# Patient Record
Sex: MALE | Race: BLACK OR AFRICAN AMERICAN | Employment: UNEMPLOYED | URBAN - METROPOLITAN AREA
[De-identification: names, ages, dates, MRNs, and addresses within clinical notes are randomized per-mention and may not be internally consistent; named-entity substitution may affect disease eponyms.]

---

## 2019-01-01 ENCOUNTER — APPOINTMENT (OUTPATIENT)
Dept: GENERAL RADIOLOGY | Age: 84
DRG: 871 | End: 2019-01-01
Attending: INTERNAL MEDICINE
Payer: MEDICARE

## 2019-01-01 ENCOUNTER — APPOINTMENT (OUTPATIENT)
Dept: GENERAL RADIOLOGY | Age: 84
DRG: 871 | End: 2019-01-01
Attending: FAMILY MEDICINE
Payer: MEDICARE

## 2019-01-01 ENCOUNTER — APPOINTMENT (OUTPATIENT)
Dept: ULTRASOUND IMAGING | Age: 84
DRG: 871 | End: 2019-01-01
Attending: INTERNAL MEDICINE
Payer: MEDICARE

## 2019-01-01 ENCOUNTER — HOSPITAL ENCOUNTER (INPATIENT)
Age: 84
LOS: 4 days | DRG: 871 | End: 2019-08-05
Attending: EMERGENCY MEDICINE | Admitting: HOSPITALIST
Payer: MEDICARE

## 2019-01-01 ENCOUNTER — APPOINTMENT (OUTPATIENT)
Dept: VASCULAR SURGERY | Age: 84
DRG: 871 | End: 2019-01-01
Attending: INTERNAL MEDICINE
Payer: MEDICARE

## 2019-01-01 ENCOUNTER — APPOINTMENT (OUTPATIENT)
Dept: CT IMAGING | Age: 84
DRG: 871 | End: 2019-01-01
Attending: HOSPITALIST
Payer: MEDICARE

## 2019-01-01 ENCOUNTER — APPOINTMENT (OUTPATIENT)
Dept: INTERVENTIONAL RADIOLOGY/VASCULAR | Age: 84
DRG: 871 | End: 2019-01-01
Attending: INTERNAL MEDICINE
Payer: MEDICARE

## 2019-01-01 ENCOUNTER — APPOINTMENT (OUTPATIENT)
Dept: GENERAL RADIOLOGY | Age: 84
DRG: 871 | End: 2019-01-01
Attending: EMERGENCY MEDICINE
Payer: MEDICARE

## 2019-01-01 VITALS
RESPIRATION RATE: 12 BRPM | SYSTOLIC BLOOD PRESSURE: 109 MMHG | HEART RATE: 103 BPM | TEMPERATURE: 97.3 F | HEIGHT: 70 IN | DIASTOLIC BLOOD PRESSURE: 64 MMHG | BODY MASS INDEX: 21.46 KG/M2 | WEIGHT: 149.91 LBS | OXYGEN SATURATION: 95 %

## 2019-01-01 DIAGNOSIS — N17.9 ACUTE RENAL FAILURE, UNSPECIFIED ACUTE RENAL FAILURE TYPE (HCC): ICD-10-CM

## 2019-01-01 DIAGNOSIS — A41.9 SEVERE SEPSIS (HCC): ICD-10-CM

## 2019-01-01 DIAGNOSIS — E87.0 HYPERNATREMIA: ICD-10-CM

## 2019-01-01 DIAGNOSIS — R40.0 SOMNOLENCE: ICD-10-CM

## 2019-01-01 DIAGNOSIS — R65.20 SEVERE SEPSIS (HCC): ICD-10-CM

## 2019-01-01 DIAGNOSIS — N19 UREMIA: ICD-10-CM

## 2019-01-01 DIAGNOSIS — R53.81 DEBILITY: ICD-10-CM

## 2019-01-01 DIAGNOSIS — D53.9 MACROCYTIC ANEMIA: ICD-10-CM

## 2019-01-01 DIAGNOSIS — Z71.89 GOALS OF CARE, COUNSELING/DISCUSSION: ICD-10-CM

## 2019-01-01 DIAGNOSIS — J18.9 HCAP (HEALTHCARE-ASSOCIATED PNEUMONIA): Primary | ICD-10-CM

## 2019-01-01 DIAGNOSIS — R06.02 SHORTNESS OF BREATH: ICD-10-CM

## 2019-01-01 LAB
ALBUMIN SERPL-MCNC: 0.9 G/DL (ref 3.5–5)
ALBUMIN SERPL-MCNC: 1 G/DL (ref 3.5–5)
ALBUMIN SERPL-MCNC: 1.1 G/DL (ref 3.5–5)
ALBUMIN SERPL-MCNC: 1.2 G/DL (ref 3.5–5)
ALBUMIN SERPL-MCNC: 1.5 G/DL (ref 3.5–5)
ALBUMIN/GLOB SERPL: 0.2 {RATIO} (ref 1.1–2.2)
ALBUMIN/GLOB SERPL: 0.2 {RATIO} (ref 1.1–2.2)
ALP SERPL-CCNC: 107 U/L (ref 45–117)
ALP SERPL-CCNC: 114 U/L (ref 45–117)
ALT SERPL-CCNC: 17 U/L (ref 12–78)
ALT SERPL-CCNC: 221 U/L (ref 12–78)
ANION GAP SERPL CALC-SCNC: 11 MMOL/L (ref 5–15)
ANION GAP SERPL CALC-SCNC: 11 MMOL/L (ref 5–15)
ANION GAP SERPL CALC-SCNC: 12 MMOL/L (ref 5–15)
ANION GAP SERPL CALC-SCNC: 13 MMOL/L (ref 5–15)
ANION GAP SERPL CALC-SCNC: 14 MMOL/L (ref 5–15)
ANION GAP SERPL CALC-SCNC: 14 MMOL/L (ref 5–15)
ANION GAP SERPL CALC-SCNC: 17 MMOL/L (ref 5–15)
ANION GAP SERPL CALC-SCNC: 9 MMOL/L (ref 5–15)
APPEARANCE UR: ABNORMAL
APTT PPP: 102.5 SEC (ref 22.1–32)
APTT PPP: 39.9 SEC (ref 22.1–32)
APTT PPP: 42.4 SEC (ref 22.1–32)
APTT PPP: 62 SEC (ref 22.1–32)
APTT PPP: 66.1 SEC (ref 22.1–32)
APTT PPP: 74.3 SEC (ref 22.1–32)
APTT PPP: 92.5 SEC (ref 22.1–32)
APTT PPP: 98.2 SEC (ref 22.1–32)
ARTERIAL PATENCY WRIST A: ABNORMAL
ARTERIAL PATENCY WRIST A: YES
ARTERIAL PATENCY WRIST A: YES
AST SERPL-CCNC: 1038 U/L (ref 15–37)
AST SERPL-CCNC: 33 U/L (ref 15–37)
ATRIAL RATE: 116 BPM
BACTERIA SPEC CULT: ABNORMAL
BACTERIA URNS QL MICRO: NEGATIVE /HPF
BASE DEFICIT BLD-SCNC: 11 MMOL/L
BASE DEFICIT BLD-SCNC: 11 MMOL/L
BASE DEFICIT BLD-SCNC: 13 MMOL/L
BASE DEFICIT BLD-SCNC: 8 MMOL/L
BASE DEFICIT BLD-SCNC: 8 MMOL/L
BASE EXCESS BLD CALC-SCNC: 14 MMOL/L
BASE EXCESS BLD CALC-SCNC: 6 MMOL/L
BASOPHILS # BLD: 0 K/UL (ref 0–0.1)
BASOPHILS # BLD: 0 K/UL (ref 0–0.1)
BASOPHILS NFR BLD: 0 % (ref 0–1)
BASOPHILS NFR BLD: 0 % (ref 0–1)
BDY SITE: ABNORMAL
BILIRUB DIRECT SERPL-MCNC: 0.7 MG/DL (ref 0–0.2)
BILIRUB SERPL-MCNC: 0.4 MG/DL (ref 0.2–1)
BILIRUB SERPL-MCNC: 1 MG/DL (ref 0.2–1)
BILIRUB UR QL: NEGATIVE
BUN SERPL-MCNC: 102 MG/DL (ref 6–20)
BUN SERPL-MCNC: 103 MG/DL (ref 6–20)
BUN SERPL-MCNC: 103 MG/DL (ref 6–20)
BUN SERPL-MCNC: 105 MG/DL (ref 6–20)
BUN SERPL-MCNC: 106 MG/DL (ref 6–20)
BUN SERPL-MCNC: 106 MG/DL (ref 6–20)
BUN SERPL-MCNC: 107 MG/DL (ref 6–20)
BUN SERPL-MCNC: 108 MG/DL (ref 6–20)
BUN SERPL-MCNC: 109 MG/DL (ref 6–20)
BUN SERPL-MCNC: 94 MG/DL (ref 6–20)
BUN/CREAT SERPL: 21 (ref 12–20)
BUN/CREAT SERPL: 21 (ref 12–20)
BUN/CREAT SERPL: 22 (ref 12–20)
BUN/CREAT SERPL: 23 (ref 12–20)
CA-I BLD-SCNC: 0.8 MMOL/L (ref 1.12–1.32)
CALCIUM SERPL-MCNC: 6.1 MG/DL (ref 8.5–10.1)
CALCIUM SERPL-MCNC: 6.2 MG/DL (ref 8.5–10.1)
CALCIUM SERPL-MCNC: 6.2 MG/DL (ref 8.5–10.1)
CALCIUM SERPL-MCNC: 6.4 MG/DL (ref 8.5–10.1)
CALCIUM SERPL-MCNC: 6.5 MG/DL (ref 8.5–10.1)
CALCIUM SERPL-MCNC: 6.5 MG/DL (ref 8.5–10.1)
CALCIUM SERPL-MCNC: 6.7 MG/DL (ref 8.5–10.1)
CALCIUM SERPL-MCNC: 6.9 MG/DL (ref 8.5–10.1)
CALCIUM SERPL-MCNC: 6.9 MG/DL (ref 8.5–10.1)
CALCIUM SERPL-MCNC: 8.8 MG/DL (ref 8.5–10.1)
CALCULATED P AXIS, ECG09: 78 DEGREES
CALCULATED R AXIS, ECG10: 82 DEGREES
CALCULATED T AXIS, ECG11: 66 DEGREES
CC UR VC: ABNORMAL
CHLORIDE SERPL-SCNC: 100 MMOL/L (ref 97–108)
CHLORIDE SERPL-SCNC: 101 MMOL/L (ref 97–108)
CHLORIDE SERPL-SCNC: 102 MMOL/L (ref 97–108)
CHLORIDE SERPL-SCNC: 104 MMOL/L (ref 97–108)
CHLORIDE SERPL-SCNC: 107 MMOL/L (ref 97–108)
CHLORIDE SERPL-SCNC: 109 MMOL/L (ref 97–108)
CHLORIDE SERPL-SCNC: 114 MMOL/L (ref 97–108)
CHLORIDE SERPL-SCNC: 121 MMOL/L (ref 97–108)
CHLORIDE SERPL-SCNC: 126 MMOL/L (ref 97–108)
CHLORIDE SERPL-SCNC: 129 MMOL/L (ref 97–108)
CO2 SERPL-SCNC: 19 MMOL/L (ref 21–32)
CO2 SERPL-SCNC: 19 MMOL/L (ref 21–32)
CO2 SERPL-SCNC: 21 MMOL/L (ref 21–32)
CO2 SERPL-SCNC: 22 MMOL/L (ref 21–32)
CO2 SERPL-SCNC: 24 MMOL/L (ref 21–32)
CO2 SERPL-SCNC: 26 MMOL/L (ref 21–32)
CO2 SERPL-SCNC: 26 MMOL/L (ref 21–32)
CO2 SERPL-SCNC: 29 MMOL/L (ref 21–32)
CO2 SERPL-SCNC: 31 MMOL/L (ref 21–32)
CO2 SERPL-SCNC: 37 MMOL/L (ref 21–32)
COLOR UR: ABNORMAL
COMMENT, HOLDF: NORMAL
COMMENT, HOLDF: NORMAL
CREAT SERPL-MCNC: 4.01 MG/DL (ref 0.7–1.3)
CREAT SERPL-MCNC: 4.51 MG/DL (ref 0.7–1.3)
CREAT SERPL-MCNC: 4.53 MG/DL (ref 0.7–1.3)
CREAT SERPL-MCNC: 4.63 MG/DL (ref 0.7–1.3)
CREAT SERPL-MCNC: 4.71 MG/DL (ref 0.7–1.3)
CREAT SERPL-MCNC: 4.74 MG/DL (ref 0.7–1.3)
CREAT SERPL-MCNC: 4.75 MG/DL (ref 0.7–1.3)
CREAT SERPL-MCNC: 4.77 MG/DL (ref 0.7–1.3)
CREAT SERPL-MCNC: 4.87 MG/DL (ref 0.7–1.3)
CREAT SERPL-MCNC: 5.15 MG/DL (ref 0.7–1.3)
DIAGNOSIS, 93000: NORMAL
DIFFERENTIAL METHOD BLD: ABNORMAL
DIFFERENTIAL METHOD BLD: ABNORMAL
EOSINOPHIL # BLD: 0 K/UL (ref 0–0.4)
EOSINOPHIL # BLD: 0 K/UL (ref 0–0.4)
EOSINOPHIL NFR BLD: 0 % (ref 0–7)
EOSINOPHIL NFR BLD: 0 % (ref 0–7)
EPITH CASTS URNS QL MICRO: ABNORMAL /LPF
ERYTHROCYTE [DISTWIDTH] IN BLOOD BY AUTOMATED COUNT: 17.3 % (ref 11.5–14.5)
ERYTHROCYTE [DISTWIDTH] IN BLOOD BY AUTOMATED COUNT: 17.6 % (ref 11.5–14.5)
ERYTHROCYTE [DISTWIDTH] IN BLOOD BY AUTOMATED COUNT: 18.3 % (ref 11.5–14.5)
ERYTHROCYTE [DISTWIDTH] IN BLOOD BY AUTOMATED COUNT: 18.3 % (ref 11.5–14.5)
ERYTHROCYTE [DISTWIDTH] IN BLOOD BY AUTOMATED COUNT: 18.8 % (ref 11.5–14.5)
ERYTHROCYTE [DISTWIDTH] IN BLOOD BY AUTOMATED COUNT: 19 % (ref 11.5–14.5)
GAS FLOW.O2 O2 DELIVERY SYS: ABNORMAL L/MIN
GAS FLOW.O2 SETTING OXYMISER: 20 BPM
GAS FLOW.O2 SETTING OXYMISER: 20 BPM
GAS FLOW.O2 SETTING OXYMISER: 22 BPM
GAS FLOW.O2 SETTING OXYMISER: 22 BPM
GAS FLOW.O2 SETTING OXYMISER: 6 L/M
GLOBULIN SER CALC-MCNC: 5 G/DL (ref 2–4)
GLOBULIN SER CALC-MCNC: 6.4 G/DL (ref 2–4)
GLUCOSE BLD STRIP.AUTO-MCNC: 141 MG/DL (ref 65–100)
GLUCOSE BLD STRIP.AUTO-MCNC: 164 MG/DL (ref 65–100)
GLUCOSE BLD STRIP.AUTO-MCNC: 165 MG/DL (ref 65–100)
GLUCOSE BLD STRIP.AUTO-MCNC: 167 MG/DL (ref 65–100)
GLUCOSE BLD STRIP.AUTO-MCNC: 231 MG/DL (ref 65–100)
GLUCOSE BLD STRIP.AUTO-MCNC: 248 MG/DL (ref 65–100)
GLUCOSE BLD STRIP.AUTO-MCNC: 249 MG/DL (ref 65–100)
GLUCOSE BLD STRIP.AUTO-MCNC: 299 MG/DL (ref 65–100)
GLUCOSE BLD STRIP.AUTO-MCNC: 320 MG/DL (ref 65–100)
GLUCOSE BLD STRIP.AUTO-MCNC: 324 MG/DL (ref 65–100)
GLUCOSE BLD STRIP.AUTO-MCNC: 324 MG/DL (ref 65–100)
GLUCOSE BLD STRIP.AUTO-MCNC: 350 MG/DL (ref 65–100)
GLUCOSE BLD STRIP.AUTO-MCNC: 354 MG/DL (ref 65–100)
GLUCOSE BLD STRIP.AUTO-MCNC: 357 MG/DL (ref 65–100)
GLUCOSE BLD STRIP.AUTO-MCNC: 382 MG/DL (ref 65–100)
GLUCOSE BLD STRIP.AUTO-MCNC: 410 MG/DL (ref 65–100)
GLUCOSE BLD STRIP.AUTO-MCNC: 51 MG/DL (ref 65–100)
GLUCOSE BLD STRIP.AUTO-MCNC: 52 MG/DL (ref 65–100)
GLUCOSE BLD STRIP.AUTO-MCNC: 77 MG/DL (ref 65–100)
GLUCOSE BLD STRIP.AUTO-MCNC: 78 MG/DL (ref 65–100)
GLUCOSE BLD STRIP.AUTO-MCNC: 79 MG/DL (ref 65–100)
GLUCOSE BLD STRIP.AUTO-MCNC: 80 MG/DL (ref 65–100)
GLUCOSE BLD STRIP.AUTO-MCNC: 83 MG/DL (ref 65–100)
GLUCOSE SERPL-MCNC: 115 MG/DL (ref 65–100)
GLUCOSE SERPL-MCNC: 239 MG/DL (ref 65–100)
GLUCOSE SERPL-MCNC: 299 MG/DL (ref 65–100)
GLUCOSE SERPL-MCNC: 307 MG/DL (ref 65–100)
GLUCOSE SERPL-MCNC: 327 MG/DL (ref 65–100)
GLUCOSE SERPL-MCNC: 349 MG/DL (ref 65–100)
GLUCOSE SERPL-MCNC: 362 MG/DL (ref 65–100)
GLUCOSE SERPL-MCNC: 375 MG/DL (ref 65–100)
GLUCOSE SERPL-MCNC: 388 MG/DL (ref 65–100)
GLUCOSE SERPL-MCNC: 72 MG/DL (ref 65–100)
GLUCOSE UR STRIP.AUTO-MCNC: NEGATIVE MG/DL
GRAM STN SPEC: ABNORMAL
HCO3 BLD-SCNC: 13.9 MMOL/L (ref 22–26)
HCO3 BLD-SCNC: 15 MMOL/L (ref 22–26)
HCO3 BLD-SCNC: 17.3 MMOL/L (ref 22–26)
HCO3 BLD-SCNC: 17.8 MMOL/L (ref 22–26)
HCO3 BLD-SCNC: 19.3 MMOL/L (ref 22–26)
HCO3 BLD-SCNC: 30.6 MMOL/L (ref 22–26)
HCO3 BLD-SCNC: 37.2 MMOL/L (ref 22–26)
HCT VFR BLD AUTO: 17.9 % (ref 36.6–50.3)
HCT VFR BLD AUTO: 21.5 % (ref 36.6–50.3)
HCT VFR BLD AUTO: 23.7 % (ref 36.6–50.3)
HCT VFR BLD AUTO: 24.7 % (ref 36.6–50.3)
HCT VFR BLD AUTO: 27.1 % (ref 36.6–50.3)
HCT VFR BLD AUTO: 29.2 % (ref 36.6–50.3)
HGB BLD-MCNC: 5.6 G/DL (ref 12.1–17)
HGB BLD-MCNC: 6.5 G/DL (ref 12.1–17)
HGB BLD-MCNC: 6.9 G/DL (ref 12.1–17)
HGB BLD-MCNC: 7 G/DL (ref 12.1–17)
HGB BLD-MCNC: 7.6 G/DL (ref 12.1–17)
HGB BLD-MCNC: 8.2 G/DL (ref 12.1–17)
HGB UR QL STRIP: ABNORMAL
IMM GRANULOCYTES # BLD AUTO: 0 K/UL
IMM GRANULOCYTES # BLD AUTO: 0 K/UL
IMM GRANULOCYTES NFR BLD AUTO: 0 %
IMM GRANULOCYTES NFR BLD AUTO: 0 %
INR PPP: 1.9 (ref 0.9–1.1)
KETONES UR QL STRIP.AUTO: NEGATIVE MG/DL
LACTATE BLD-SCNC: 6.84 MMOL/L (ref 0.4–2)
LACTATE BLD-SCNC: 7.08 MMOL/L (ref 0.4–2)
LACTATE SERPL-SCNC: 10.2 MMOL/L (ref 0.4–2)
LACTATE SERPL-SCNC: 11.2 MMOL/L (ref 0.4–2)
LACTATE SERPL-SCNC: 4.4 MMOL/L (ref 0.4–2)
LACTATE SERPL-SCNC: 5.9 MMOL/L (ref 0.4–2)
LACTATE SERPL-SCNC: 7.9 MMOL/L (ref 0.4–2)
LEUKOCYTE ESTERASE UR QL STRIP.AUTO: ABNORMAL
LYMPHOCYTES # BLD: 1.8 K/UL (ref 0.8–3.5)
LYMPHOCYTES # BLD: 2.3 K/UL (ref 0.8–3.5)
LYMPHOCYTES NFR BLD: 12 % (ref 12–49)
LYMPHOCYTES NFR BLD: 9 % (ref 12–49)
MAGNESIUM SERPL-MCNC: 1.9 MG/DL (ref 1.6–2.4)
MAGNESIUM SERPL-MCNC: 2.1 MG/DL (ref 1.6–2.4)
MAGNESIUM SERPL-MCNC: 2.4 MG/DL (ref 1.6–2.4)
MAGNESIUM SERPL-MCNC: 2.8 MG/DL (ref 1.6–2.4)
MCH RBC QN AUTO: 28.4 PG (ref 26–34)
MCH RBC QN AUTO: 28.6 PG (ref 26–34)
MCH RBC QN AUTO: 28.6 PG (ref 26–34)
MCH RBC QN AUTO: 28.9 PG (ref 26–34)
MCH RBC QN AUTO: 28.9 PG (ref 26–34)
MCH RBC QN AUTO: 29.4 PG (ref 26–34)
MCHC RBC AUTO-ENTMCNC: 28 G/DL (ref 30–36.5)
MCHC RBC AUTO-ENTMCNC: 28.1 G/DL (ref 30–36.5)
MCHC RBC AUTO-ENTMCNC: 28.3 G/DL (ref 30–36.5)
MCHC RBC AUTO-ENTMCNC: 29.1 G/DL (ref 30–36.5)
MCHC RBC AUTO-ENTMCNC: 30.2 G/DL (ref 30–36.5)
MCHC RBC AUTO-ENTMCNC: 31.3 G/DL (ref 30–36.5)
MCV RBC AUTO: 102.1 FL (ref 80–99)
MCV RBC AUTO: 103 FL (ref 80–99)
MCV RBC AUTO: 104.7 FL (ref 80–99)
MCV RBC AUTO: 91.3 FL (ref 80–99)
MCV RBC AUTO: 93.9 FL (ref 80–99)
MCV RBC AUTO: 98.3 FL (ref 80–99)
MONOCYTES # BLD: 0.8 K/UL (ref 0–1)
MONOCYTES # BLD: 0.8 K/UL (ref 0–1)
MONOCYTES NFR BLD: 4 % (ref 5–13)
MONOCYTES NFR BLD: 4 % (ref 5–13)
NEUTS BAND NFR BLD MANUAL: 1 % (ref 0–6)
NEUTS BAND NFR BLD MANUAL: 8 % (ref 0–6)
NEUTS SEG # BLD: 16.2 K/UL (ref 1.8–8)
NEUTS SEG # BLD: 17 K/UL (ref 1.8–8)
NEUTS SEG NFR BLD: 79 % (ref 32–75)
NEUTS SEG NFR BLD: 83 % (ref 32–75)
NITRITE UR QL STRIP.AUTO: NEGATIVE
NRBC # BLD: 0.18 K/UL (ref 0–0.01)
NRBC # BLD: 0.35 K/UL (ref 0–0.01)
NRBC # BLD: 0.48 K/UL (ref 0–0.01)
NRBC # BLD: 0.56 K/UL (ref 0–0.01)
NRBC # BLD: 0.68 K/UL (ref 0–0.01)
NRBC # BLD: 0.87 K/UL (ref 0–0.01)
NRBC BLD-RTO: 0.9 PER 100 WBC
NRBC BLD-RTO: 1.9 PER 100 WBC
NRBC BLD-RTO: 2.5 PER 100 WBC
NRBC BLD-RTO: 3.2 PER 100 WBC
NRBC BLD-RTO: 3.5 PER 100 WBC
NRBC BLD-RTO: 4.4 PER 100 WBC
O2/TOTAL GAS SETTING VFR VENT: 0.5 %
O2/TOTAL GAS SETTING VFR VENT: 100 %
P-R INTERVAL, ECG05: 162 MS
PCO2 BLD: 22 MMHG (ref 35–45)
PCO2 BLD: 30.8 MMHG (ref 35–45)
PCO2 BLD: 39.5 MMHG (ref 35–45)
PCO2 BLD: 42.1 MMHG (ref 35–45)
PCO2 BLD: 47.4 MMHG (ref 35–45)
PCO2 BLD: 48.1 MMHG (ref 35–45)
PCO2 BLD: 49.2 MMHG (ref 35–45)
PEEP RESPIRATORY: 10 CMH2O
PEEP RESPIRATORY: 5 CMH2O
PEEP RESPIRATORY: 8 CMH2O
PH BLD: 7.17 [PH] (ref 7.35–7.45)
PH BLD: 7.19 [PH] (ref 7.35–7.45)
PH BLD: 7.27 [PH] (ref 7.35–7.45)
PH BLD: 7.36 [PH] (ref 7.35–7.45)
PH BLD: 7.41 [PH] (ref 7.35–7.45)
PH BLD: 7.42 [PH] (ref 7.35–7.45)
PH BLD: 7.5 [PH] (ref 7.35–7.45)
PH UR STRIP: 6 [PH] (ref 5–8)
PHOSPHATE SERPL-MCNC: 5.3 MG/DL (ref 2.6–4.7)
PHOSPHATE SERPL-MCNC: 6.2 MG/DL (ref 2.6–4.7)
PHOSPHOLIPID P SER-MCNC: 134 MG/DL (ref 150–250)
PIP ISTAT,IPIP: 15
PIP ISTAT,IPIP: 15
PIP ISTAT,IPIP: 27
PIP ISTAT,IPIP: 30
PLATELET # BLD AUTO: 102 K/UL (ref 150–400)
PLATELET # BLD AUTO: 107 K/UL (ref 150–400)
PLATELET # BLD AUTO: 116 K/UL (ref 150–400)
PLATELET # BLD AUTO: 152 K/UL (ref 150–400)
PLATELET # BLD AUTO: 260 K/UL (ref 150–400)
PLATELET # BLD AUTO: 87 K/UL (ref 150–400)
PLATELET COMMENTS,PCOM: ABNORMAL
PMV BLD AUTO: 10.9 FL (ref 8.9–12.9)
PMV BLD AUTO: 11.3 FL (ref 8.9–12.9)
PMV BLD AUTO: 11.6 FL (ref 8.9–12.9)
PMV BLD AUTO: 11.7 FL (ref 8.9–12.9)
PMV BLD AUTO: 11.8 FL (ref 8.9–12.9)
PMV BLD AUTO: 12.1 FL (ref 8.9–12.9)
PO2 BLD: 102 MMHG (ref 80–100)
PO2 BLD: 149 MMHG (ref 80–100)
PO2 BLD: 280 MMHG (ref 80–100)
PO2 BLD: 58 MMHG (ref 80–100)
PO2 BLD: 74 MMHG (ref 80–100)
PO2 BLD: 76 MMHG (ref 80–100)
PO2 BLD: 89 MMHG (ref 80–100)
POTASSIUM SERPL-SCNC: 3 MMOL/L (ref 3.5–5.1)
POTASSIUM SERPL-SCNC: 3.8 MMOL/L (ref 3.5–5.1)
POTASSIUM SERPL-SCNC: 4.2 MMOL/L (ref 3.5–5.1)
POTASSIUM SERPL-SCNC: 4.9 MMOL/L (ref 3.5–5.1)
POTASSIUM SERPL-SCNC: 5.1 MMOL/L (ref 3.5–5.1)
POTASSIUM SERPL-SCNC: 5.3 MMOL/L (ref 3.5–5.1)
POTASSIUM SERPL-SCNC: 5.3 MMOL/L (ref 3.5–5.1)
POTASSIUM SERPL-SCNC: 5.5 MMOL/L (ref 3.5–5.1)
POTASSIUM SERPL-SCNC: 5.5 MMOL/L (ref 3.5–5.1)
POTASSIUM SERPL-SCNC: 5.8 MMOL/L (ref 3.5–5.1)
PRESSURE SUPPORT SETTING VENT: 5 CMH2O
PRESSURE SUPPORT SETTING VENT: 5 CMH2O
PROCALCITONIN SERPL-MCNC: 27.4 NG/ML
PROCALCITONIN SERPL-MCNC: 30.3 NG/ML
PROCALCITONIN SERPL-MCNC: 30.4 NG/ML
PROCALCITONIN SERPL-MCNC: 33.8 NG/ML
PROT SERPL-MCNC: 6.2 G/DL (ref 6.4–8.2)
PROT SERPL-MCNC: 7.9 G/DL (ref 6.4–8.2)
PROT UR STRIP-MCNC: 100 MG/DL
PROTHROMBIN TIME: 18.2 SEC (ref 9–11.1)
Q-T INTERVAL, ECG07: 342 MS
QRS DURATION, ECG06: 64 MS
QTC CALCULATION (BEZET), ECG08: 475 MS
RBC # BLD AUTO: 1.96 M/UL (ref 4.1–5.7)
RBC # BLD AUTO: 2.29 M/UL (ref 4.1–5.7)
RBC # BLD AUTO: 2.41 M/UL (ref 4.1–5.7)
RBC # BLD AUTO: 2.42 M/UL (ref 4.1–5.7)
RBC # BLD AUTO: 2.63 M/UL (ref 4.1–5.7)
RBC # BLD AUTO: 2.79 M/UL (ref 4.1–5.7)
RBC #/AREA URNS HPF: ABNORMAL /HPF (ref 0–5)
RBC MORPH BLD: ABNORMAL
SAMPLES BEING HELD,HOLD: NORMAL
SAMPLES BEING HELD,HOLD: NORMAL
SAO2 % BLD: 100 % (ref 92–97)
SAO2 % BLD: 89 % (ref 92–97)
SAO2 % BLD: 91 % (ref 92–97)
SAO2 % BLD: 95 % (ref 92–97)
SAO2 % BLD: 96 % (ref 92–97)
SAO2 % BLD: 97 % (ref 92–97)
SAO2 % BLD: 99 % (ref 92–97)
SERVICE CMNT-IMP: ABNORMAL
SERVICE CMNT-IMP: NORMAL
SODIUM SERPL-SCNC: 142 MMOL/L (ref 136–145)
SODIUM SERPL-SCNC: 143 MMOL/L (ref 136–145)
SODIUM SERPL-SCNC: 144 MMOL/L (ref 136–145)
SODIUM SERPL-SCNC: 144 MMOL/L (ref 136–145)
SODIUM SERPL-SCNC: 146 MMOL/L (ref 136–145)
SODIUM SERPL-SCNC: 146 MMOL/L (ref 136–145)
SODIUM SERPL-SCNC: 150 MMOL/L (ref 136–145)
SODIUM SERPL-SCNC: 157 MMOL/L (ref 136–145)
SODIUM SERPL-SCNC: 159 MMOL/L (ref 136–145)
SODIUM SERPL-SCNC: 163 MMOL/L (ref 136–145)
SP GR UR REFRACTOMETRY: 1.02 (ref 1–1.03)
SPECIMEN TYPE: ABNORMAL
THERAPEUTIC RANGE,PTTT: ABNORMAL SECS (ref 58–77)
TOTAL RESP. RATE, ITRR: 20
TOTAL RESP. RATE, ITRR: 25
TOTAL RESP. RATE, ITRR: 27
TOTAL RESP. RATE, ITRR: 28
TOTAL RESP. RATE, ITRR: 30
TOTAL RESP. RATE, ITRR: 41
TROPONIN I SERPL-MCNC: 0.3 NG/ML
UROBILINOGEN UR QL STRIP.AUTO: 1 EU/DL (ref 0.2–1)
VANCOMYCIN SERPL-MCNC: 11.1 UG/ML
VANCOMYCIN SERPL-MCNC: 14.3 UG/ML
VANCOMYCIN SERPL-MCNC: 17.7 UG/ML
VENTILATION MODE VENT: ABNORMAL
VENTRICULAR RATE, ECG03: 116 BPM
VT SETTING VENT: 400 ML
WBC # BLD AUTO: 17.5 K/UL (ref 4.1–11.1)
WBC # BLD AUTO: 18.2 K/UL (ref 4.1–11.1)
WBC # BLD AUTO: 18.9 K/UL (ref 4.1–11.1)
WBC # BLD AUTO: 19.2 K/UL (ref 4.1–11.1)
WBC # BLD AUTO: 19.3 K/UL (ref 4.1–11.1)
WBC # BLD AUTO: 19.6 K/UL (ref 4.1–11.1)
WBC URNS QL MICRO: ABNORMAL /HPF (ref 0–4)

## 2019-01-01 PROCEDURE — 74011250636 HC RX REV CODE- 250/636: Performed by: INTERNAL MEDICINE

## 2019-01-01 PROCEDURE — 94640 AIRWAY INHALATION TREATMENT: CPT

## 2019-01-01 PROCEDURE — 96360 HYDRATION IV INFUSION INIT: CPT

## 2019-01-01 PROCEDURE — 82803 BLOOD GASES ANY COMBINATION: CPT

## 2019-01-01 PROCEDURE — 74011250636 HC RX REV CODE- 250/636: Performed by: NURSE PRACTITIONER

## 2019-01-01 PROCEDURE — 74011636637 HC RX REV CODE- 636/637: Performed by: INTERNAL MEDICINE

## 2019-01-01 PROCEDURE — 74011250636 HC RX REV CODE- 250/636: Performed by: PHYSICAL MEDICINE & REHABILITATION

## 2019-01-01 PROCEDURE — 77030013140 HC MSK NEB VYRM -A

## 2019-01-01 PROCEDURE — 80202 ASSAY OF VANCOMYCIN: CPT

## 2019-01-01 PROCEDURE — 74011000250 HC RX REV CODE- 250

## 2019-01-01 PROCEDURE — 36415 COLL VENOUS BLD VENIPUNCTURE: CPT

## 2019-01-01 PROCEDURE — 02HV33Z INSERTION OF INFUSION DEVICE INTO SUPERIOR VENA CAVA, PERCUTANEOUS APPROACH: ICD-10-PCS | Performed by: INTERNAL MEDICINE

## 2019-01-01 PROCEDURE — 92950 HEART/LUNG RESUSCITATION CPR: CPT

## 2019-01-01 PROCEDURE — 77030005402 HC CATH RAD ART LN KT TELE -B

## 2019-01-01 PROCEDURE — 83735 ASSAY OF MAGNESIUM: CPT

## 2019-01-01 PROCEDURE — 85730 THROMBOPLASTIN TIME PARTIAL: CPT

## 2019-01-01 PROCEDURE — 74011000258 HC RX REV CODE- 258: Performed by: EMERGENCY MEDICINE

## 2019-01-01 PROCEDURE — 74011250636 HC RX REV CODE- 250/636: Performed by: FAMILY MEDICINE

## 2019-01-01 PROCEDURE — 74011000258 HC RX REV CODE- 258: Performed by: HOSPITALIST

## 2019-01-01 PROCEDURE — 71045 X-RAY EXAM CHEST 1 VIEW: CPT

## 2019-01-01 PROCEDURE — 87186 SC STD MICRODIL/AGAR DIL: CPT

## 2019-01-01 PROCEDURE — 74011000250 HC RX REV CODE- 250: Performed by: INTERNAL MEDICINE

## 2019-01-01 PROCEDURE — 99291 CRITICAL CARE FIRST HOUR: CPT

## 2019-01-01 PROCEDURE — 74011000250 HC RX REV CODE- 250: Performed by: EMERGENCY MEDICINE

## 2019-01-01 PROCEDURE — 80076 HEPATIC FUNCTION PANEL: CPT

## 2019-01-01 PROCEDURE — 96368 THER/DIAG CONCURRENT INF: CPT

## 2019-01-01 PROCEDURE — 83605 ASSAY OF LACTIC ACID: CPT

## 2019-01-01 PROCEDURE — 77030008683 HC TU ET CUF COVD -A

## 2019-01-01 PROCEDURE — 36600 WITHDRAWAL OF ARTERIAL BLOOD: CPT

## 2019-01-01 PROCEDURE — 93971 EXTREMITY STUDY: CPT

## 2019-01-01 PROCEDURE — 87077 CULTURE AEROBIC IDENTIFY: CPT

## 2019-01-01 PROCEDURE — 74011636637 HC RX REV CODE- 636/637: Performed by: NURSE PRACTITIONER

## 2019-01-01 PROCEDURE — 65610000006 HC RM INTENSIVE CARE

## 2019-01-01 PROCEDURE — 80069 RENAL FUNCTION PANEL: CPT

## 2019-01-01 PROCEDURE — 84311 SPECTROPHOTOMETRY: CPT

## 2019-01-01 PROCEDURE — 74011250636 HC RX REV CODE- 250/636

## 2019-01-01 PROCEDURE — 85027 COMPLETE CBC AUTOMATED: CPT

## 2019-01-01 PROCEDURE — 36556 INSERT NON-TUNNEL CV CATH: CPT

## 2019-01-01 PROCEDURE — 74011250636 HC RX REV CODE- 250/636: Performed by: HOSPITALIST

## 2019-01-01 PROCEDURE — 76770 US EXAM ABDO BACK WALL COMP: CPT

## 2019-01-01 PROCEDURE — 94002 VENT MGMT INPAT INIT DAY: CPT

## 2019-01-01 PROCEDURE — 74011000250 HC RX REV CODE- 250: Performed by: HOSPITALIST

## 2019-01-01 PROCEDURE — 74011000250 HC RX REV CODE- 250: Performed by: PHYSICAL MEDICINE & REHABILITATION

## 2019-01-01 PROCEDURE — 84145 PROCALCITONIN (PCT): CPT

## 2019-01-01 PROCEDURE — 87070 CULTURE OTHR SPECIMN AEROBIC: CPT

## 2019-01-01 PROCEDURE — 81001 URINALYSIS AUTO W/SCOPE: CPT

## 2019-01-01 PROCEDURE — 96366 THER/PROPH/DIAG IV INF ADDON: CPT

## 2019-01-01 PROCEDURE — 93005 ELECTROCARDIOGRAM TRACING: CPT

## 2019-01-01 PROCEDURE — 74011000258 HC RX REV CODE- 258: Performed by: INTERNAL MEDICINE

## 2019-01-01 PROCEDURE — 80053 COMPREHEN METABOLIC PANEL: CPT

## 2019-01-01 PROCEDURE — 77030008477 HC STYL SATN SLP COVD -A

## 2019-01-01 PROCEDURE — 77030011943

## 2019-01-01 PROCEDURE — 5A12012 PERFORMANCE OF CARDIAC OUTPUT, SINGLE, MANUAL: ICD-10-PCS | Performed by: FAMILY MEDICINE

## 2019-01-01 PROCEDURE — 82962 GLUCOSE BLOOD TEST: CPT

## 2019-01-01 PROCEDURE — C1751 CATH, INF, PER/CENT/MIDLINE: HCPCS

## 2019-01-01 PROCEDURE — 0BH17EZ INSERTION OF ENDOTRACHEAL AIRWAY INTO TRACHEA, VIA NATURAL OR ARTIFICIAL OPENING: ICD-10-PCS | Performed by: INTERNAL MEDICINE

## 2019-01-01 PROCEDURE — 76450000000

## 2019-01-01 PROCEDURE — 80048 BASIC METABOLIC PNL TOTAL CA: CPT

## 2019-01-01 PROCEDURE — 85610 PROTHROMBIN TIME: CPT

## 2019-01-01 PROCEDURE — 5A09357 ASSISTANCE WITH RESPIRATORY VENTILATION, LESS THAN 24 CONSECUTIVE HOURS, CONTINUOUS POSITIVE AIRWAY PRESSURE: ICD-10-PCS | Performed by: INTERNAL MEDICINE

## 2019-01-01 PROCEDURE — 74011000258 HC RX REV CODE- 258: Performed by: FAMILY MEDICINE

## 2019-01-01 PROCEDURE — 74011250636 HC RX REV CODE- 250/636: Performed by: EMERGENCY MEDICINE

## 2019-01-01 PROCEDURE — 96361 HYDRATE IV INFUSION ADD-ON: CPT

## 2019-01-01 PROCEDURE — 36591 DRAW BLOOD OFF VENOUS DEVICE: CPT

## 2019-01-01 PROCEDURE — 85025 COMPLETE CBC W/AUTO DIFF WBC: CPT

## 2019-01-01 PROCEDURE — 94664 DEMO&/EVAL PT USE INHALER: CPT

## 2019-01-01 PROCEDURE — 84484 ASSAY OF TROPONIN QUANT: CPT

## 2019-01-01 PROCEDURE — 82040 ASSAY OF SERUM ALBUMIN: CPT

## 2019-01-01 PROCEDURE — 74011000250 HC RX REV CODE- 250: Performed by: FAMILY MEDICINE

## 2019-01-01 PROCEDURE — 77030013797 HC KT TRNSDUC PRSSR EDWD -A

## 2019-01-01 PROCEDURE — 74018 RADEX ABDOMEN 1 VIEW: CPT

## 2019-01-01 PROCEDURE — 96365 THER/PROPH/DIAG IV INF INIT: CPT

## 2019-01-01 PROCEDURE — 87040 BLOOD CULTURE FOR BACTERIA: CPT

## 2019-01-01 PROCEDURE — 94003 VENT MGMT INPAT SUBQ DAY: CPT

## 2019-01-01 PROCEDURE — 87086 URINE CULTURE/COLONY COUNT: CPT

## 2019-01-01 PROCEDURE — 03HY32Z INSERTION OF MONITORING DEVICE INTO UPPER ARTERY, PERCUTANEOUS APPROACH: ICD-10-PCS | Performed by: ANESTHESIOLOGY

## 2019-01-01 PROCEDURE — 5A1945Z RESPIRATORY VENTILATION, 24-96 CONSECUTIVE HOURS: ICD-10-PCS | Performed by: HOSPITALIST

## 2019-01-01 PROCEDURE — 77030013033 HC MSK BPAP/CPAP MMKA -B

## 2019-01-01 PROCEDURE — 71250 CT THORAX DX C-: CPT

## 2019-01-01 PROCEDURE — 3E043XZ INTRODUCTION OF VASOPRESSOR INTO CENTRAL VEIN, PERCUTANEOUS APPROACH: ICD-10-PCS | Performed by: INTERNAL MEDICINE

## 2019-01-01 RX ORDER — LANOLIN ALCOHOL/MO/W.PET/CERES
500 CREAM (GRAM) TOPICAL DAILY
COMMUNITY
Start: 2019-01-01

## 2019-01-01 RX ORDER — SODIUM BICARBONATE 1 MEQ/ML
50 SYRINGE (ML) INTRAVENOUS AS NEEDED
Status: DISCONTINUED | OUTPATIENT
Start: 2019-01-01 | End: 2019-01-01

## 2019-01-01 RX ORDER — SODIUM BICARBONATE 1 MEQ/ML
SYRINGE (ML) INTRAVENOUS
Status: COMPLETED
Start: 2019-01-01 | End: 2019-01-01

## 2019-01-01 RX ORDER — EPINEPHRINE 0.1 MG/ML
INJECTION INTRACARDIAC; INTRAVENOUS
Status: COMPLETED | OUTPATIENT
Start: 2019-01-01 | End: 2019-01-01

## 2019-01-01 RX ORDER — DEXTROSE 50 % IN WATER (D50W) INTRAVENOUS SYRINGE
25-50 AS NEEDED
Status: DISCONTINUED | OUTPATIENT
Start: 2019-01-01 | End: 2019-01-01

## 2019-01-01 RX ORDER — CHLORHEXIDINE GLUCONATE 1.2 MG/ML
15 RINSE ORAL EVERY 12 HOURS
Status: DISCONTINUED | OUTPATIENT
Start: 2019-01-01 | End: 2019-01-01

## 2019-01-01 RX ORDER — MELATONIN
1000 DAILY
COMMUNITY
Start: 2019-01-01

## 2019-01-01 RX ORDER — SODIUM CHLORIDE 0.9 % (FLUSH) 0.9 %
5-40 SYRINGE (ML) INJECTION EVERY 8 HOURS
Status: DISCONTINUED | OUTPATIENT
Start: 2019-01-01 | End: 2019-01-01

## 2019-01-01 RX ORDER — SODIUM CHLORIDE, SODIUM LACTATE, POTASSIUM CHLORIDE, CALCIUM CHLORIDE 600; 310; 30; 20 MG/100ML; MG/100ML; MG/100ML; MG/100ML
1000 INJECTION, SOLUTION INTRAVENOUS CONTINUOUS
Status: DISCONTINUED | OUTPATIENT
Start: 2019-01-01 | End: 2019-01-01

## 2019-01-01 RX ORDER — DEXTROSE MONOHYDRATE 50 MG/ML
50 INJECTION, SOLUTION INTRAVENOUS CONTINUOUS
Status: DISCONTINUED | OUTPATIENT
Start: 2019-01-01 | End: 2019-01-01

## 2019-01-01 RX ORDER — GLYCOPYRROLATE 0.2 MG/ML
0.2 INJECTION INTRAMUSCULAR; INTRAVENOUS
Status: DISCONTINUED | OUTPATIENT
Start: 2019-01-01 | End: 2019-01-01 | Stop reason: HOSPADM

## 2019-01-01 RX ORDER — HEPARIN SODIUM 10000 [USP'U]/100ML
18-36 INJECTION, SOLUTION INTRAVENOUS
Status: DISCONTINUED | OUTPATIENT
Start: 2019-01-01 | End: 2019-01-01

## 2019-01-01 RX ORDER — SODIUM CHLORIDE 0.9 % (FLUSH) 0.9 %
5-40 SYRINGE (ML) INJECTION AS NEEDED
Status: DISCONTINUED | OUTPATIENT
Start: 2019-01-01 | End: 2019-01-01 | Stop reason: HOSPADM

## 2019-01-01 RX ORDER — ALLOPURINOL 100 MG/1
100 TABLET ORAL DAILY
COMMUNITY
Start: 2019-01-01

## 2019-01-01 RX ORDER — SODIUM BICARBONATE 84 MG/ML
50 INJECTION, SOLUTION INTRAVENOUS AS NEEDED
Status: DISCONTINUED | OUTPATIENT
Start: 2019-01-01 | End: 2019-01-01

## 2019-01-01 RX ORDER — MORPHINE SULFATE 2 MG/ML
2 INJECTION, SOLUTION INTRAMUSCULAR; INTRAVENOUS
Status: DISCONTINUED | OUTPATIENT
Start: 2019-01-01 | End: 2019-01-01 | Stop reason: SDUPTHER

## 2019-01-01 RX ORDER — IPRATROPIUM BROMIDE AND ALBUTEROL SULFATE 2.5; .5 MG/3ML; MG/3ML
3 SOLUTION RESPIRATORY (INHALATION)
Status: DISCONTINUED | OUTPATIENT
Start: 2019-01-01 | End: 2019-01-01

## 2019-01-01 RX ORDER — MAGNESIUM SULFATE 100 %
4 CRYSTALS MISCELLANEOUS AS NEEDED
Status: DISCONTINUED | OUTPATIENT
Start: 2019-01-01 | End: 2019-01-01

## 2019-01-01 RX ORDER — CALCIUM GLUCONATE 94 MG/ML
2 INJECTION, SOLUTION INTRAVENOUS ONCE
Status: DISCONTINUED | OUTPATIENT
Start: 2019-01-01 | End: 2019-01-01 | Stop reason: SDUPTHER

## 2019-01-01 RX ORDER — METOPROLOL SUCCINATE 50 MG/1
50 TABLET, EXTENDED RELEASE ORAL DAILY
COMMUNITY
Start: 2019-01-01

## 2019-01-01 RX ORDER — SODIUM BICARBONATE 650 MG/1
650 TABLET ORAL 2 TIMES DAILY
COMMUNITY
Start: 2019-01-01

## 2019-01-01 RX ORDER — SODIUM BICARBONATE 1 MEQ/ML
SYRINGE (ML) INTRAVENOUS
Status: DISPENSED
Start: 2019-01-01 | End: 2019-01-01

## 2019-01-01 RX ORDER — FOLIC ACID 1 MG/1
1 TABLET ORAL DAILY
COMMUNITY
Start: 2019-01-01

## 2019-01-01 RX ORDER — IPRATROPIUM BROMIDE AND ALBUTEROL SULFATE 2.5; .5 MG/3ML; MG/3ML
3 SOLUTION RESPIRATORY (INHALATION) ONCE
Status: COMPLETED | OUTPATIENT
Start: 2019-01-01 | End: 2019-01-01

## 2019-01-01 RX ORDER — INSULIN LISPRO 100 [IU]/ML
INJECTION, SOLUTION INTRAVENOUS; SUBCUTANEOUS EVERY 6 HOURS
Status: DISCONTINUED | OUTPATIENT
Start: 2019-01-01 | End: 2019-01-01

## 2019-01-01 RX ORDER — INSULIN LISPRO 100 [IU]/ML
INJECTION, SOLUTION INTRAVENOUS; SUBCUTANEOUS
COMMUNITY
Start: 2019-01-01

## 2019-01-01 RX ORDER — ALBUTEROL SULFATE 0.83 MG/ML
2.5 SOLUTION RESPIRATORY (INHALATION)
COMMUNITY

## 2019-01-01 RX ORDER — SODIUM BICARBONATE 1 MEQ/ML
SYRINGE (ML) INTRAVENOUS
Status: COMPLETED | OUTPATIENT
Start: 2019-01-01 | End: 2019-01-01

## 2019-01-01 RX ORDER — SODIUM CHLORIDE 9 MG/ML
250 INJECTION, SOLUTION INTRAVENOUS AS NEEDED
Status: DISCONTINUED | OUTPATIENT
Start: 2019-01-01 | End: 2019-01-01 | Stop reason: HOSPADM

## 2019-01-01 RX ORDER — INSULIN LISPRO 100 [IU]/ML
INJECTION, SOLUTION INTRAVENOUS; SUBCUTANEOUS EVERY 6 HOURS
Status: DISCONTINUED | OUTPATIENT
Start: 2019-01-01 | End: 2019-01-01 | Stop reason: SDUPTHER

## 2019-01-01 RX ORDER — VANCOMYCIN HYDROCHLORIDE
1250 ONCE
Status: COMPLETED | OUTPATIENT
Start: 2019-01-01 | End: 2019-01-01

## 2019-01-01 RX ORDER — LORAZEPAM 2 MG/ML
2 INJECTION INTRAMUSCULAR
Status: DISCONTINUED | OUTPATIENT
Start: 2019-01-01 | End: 2019-01-01 | Stop reason: HOSPADM

## 2019-01-01 RX ORDER — HYDROMORPHONE HYDROCHLORIDE 1 MG/ML
1.5 INJECTION, SOLUTION INTRAMUSCULAR; INTRAVENOUS; SUBCUTANEOUS
Status: DISCONTINUED | OUTPATIENT
Start: 2019-01-01 | End: 2019-01-01 | Stop reason: HOSPADM

## 2019-01-01 RX ORDER — DEXTROSE 50 % IN WATER (D50W) INTRAVENOUS SYRINGE
12.5-25 AS NEEDED
Status: DISCONTINUED | OUTPATIENT
Start: 2019-01-01 | End: 2019-01-01 | Stop reason: SDUPTHER

## 2019-01-01 RX ORDER — ETOMIDATE 2 MG/ML
INJECTION INTRAVENOUS
Status: COMPLETED
Start: 2019-01-01 | End: 2019-01-01

## 2019-01-01 RX ORDER — ACETAMINOPHEN 325 MG/1
325 TABLET ORAL
COMMUNITY

## 2019-01-01 RX ORDER — HYDROCORTISONE SODIUM SUCCINATE 100 MG/2ML
50 INJECTION, POWDER, FOR SOLUTION INTRAMUSCULAR; INTRAVENOUS EVERY 8 HOURS
Status: DISCONTINUED | OUTPATIENT
Start: 2019-01-01 | End: 2019-01-01

## 2019-01-01 RX ORDER — POTASSIUM CHLORIDE 7.45 MG/ML
10 INJECTION INTRAVENOUS
Status: COMPLETED | OUTPATIENT
Start: 2019-01-01 | End: 2019-01-01

## 2019-01-01 RX ORDER — LANOLIN ALCOHOL/MO/W.PET/CERES
100 CREAM (GRAM) TOPICAL DAILY
COMMUNITY
Start: 2019-01-01

## 2019-01-01 RX ORDER — FENTANYL CITRATE 50 UG/ML
25 INJECTION, SOLUTION INTRAMUSCULAR; INTRAVENOUS
Status: DISCONTINUED | OUTPATIENT
Start: 2019-01-01 | End: 2019-01-01 | Stop reason: HOSPADM

## 2019-01-01 RX ORDER — ETOMIDATE 2 MG/ML
20 INJECTION INTRAVENOUS ONCE
Status: COMPLETED | OUTPATIENT
Start: 2019-01-01 | End: 2019-01-01

## 2019-01-01 RX ORDER — DEXTROSE 50 % IN WATER (D50W) INTRAVENOUS SYRINGE
Status: COMPLETED
Start: 2019-01-01 | End: 2019-01-01

## 2019-01-01 RX ORDER — NOREPINEPHRINE BITARTRATE/D5W 8 MG/250ML
2-100 PLASTIC BAG, INJECTION (ML) INTRAVENOUS
Status: DISCONTINUED | OUTPATIENT
Start: 2019-01-01 | End: 2019-01-01

## 2019-01-01 RX ORDER — DOPAMINE HYDROCHLORIDE 320 MG/100ML
0-10 INJECTION, SOLUTION INTRAVENOUS
Status: DISCONTINUED | OUTPATIENT
Start: 2019-01-01 | End: 2019-01-01

## 2019-01-01 RX ORDER — HEPARIN SODIUM 5000 [USP'U]/ML
5000 INJECTION, SOLUTION INTRAVENOUS; SUBCUTANEOUS EVERY 12 HOURS
Status: DISCONTINUED | OUTPATIENT
Start: 2019-01-01 | End: 2019-01-01

## 2019-01-01 RX ORDER — SODIUM BICARBONATE 650 MG/1
650 TABLET ORAL 2 TIMES DAILY
Status: CANCELLED | OUTPATIENT
Start: 2019-01-01

## 2019-01-01 RX ORDER — MIDAZOLAM HYDROCHLORIDE 1 MG/ML
INJECTION, SOLUTION INTRAMUSCULAR; INTRAVENOUS
Status: COMPLETED
Start: 2019-01-01 | End: 2019-01-01

## 2019-01-01 RX ORDER — SODIUM CHLORIDE 0.9 % (FLUSH) 0.9 %
5-10 SYRINGE (ML) INJECTION AS NEEDED
Status: DISCONTINUED | OUTPATIENT
Start: 2019-01-01 | End: 2019-01-01 | Stop reason: HOSPADM

## 2019-01-01 RX ORDER — BISMUTH SUBSALICYLATE 262 MG
1 TABLET,CHEWABLE ORAL DAILY
COMMUNITY

## 2019-01-01 RX ORDER — MIDAZOLAM HYDROCHLORIDE 1 MG/ML
2 INJECTION, SOLUTION INTRAMUSCULAR; INTRAVENOUS ONCE
Status: COMPLETED | OUTPATIENT
Start: 2019-01-01 | End: 2019-01-01

## 2019-01-01 RX ORDER — INSULIN GLARGINE 100 [IU]/ML
10 INJECTION, SOLUTION SUBCUTANEOUS DAILY
Status: DISCONTINUED | OUTPATIENT
Start: 2019-01-01 | End: 2019-01-01

## 2019-01-01 RX ORDER — METOPROLOL TARTRATE 25 MG/1
12.5 TABLET, FILM COATED ORAL 2 TIMES DAILY
COMMUNITY
Start: 2019-01-01

## 2019-01-01 RX ADMIN — Medication 150 MCG/HR: at 12:12

## 2019-01-01 RX ADMIN — HEPARIN SODIUM 9 UNITS/KG/HR: 10000 INJECTION, SOLUTION INTRAVENOUS at 03:20

## 2019-01-01 RX ADMIN — DEXTROSE MONOHYDRATE 75 ML/HR: 5 INJECTION, SOLUTION INTRAVENOUS at 10:20

## 2019-01-01 RX ADMIN — IPRATROPIUM BROMIDE AND ALBUTEROL SULFATE 3 ML: .5; 3 SOLUTION RESPIRATORY (INHALATION) at 13:24

## 2019-01-01 RX ADMIN — Medication 36 MCG/MIN: at 10:39

## 2019-01-01 RX ADMIN — INSULIN LISPRO 3 UNITS: 100 INJECTION, SOLUTION INTRAVENOUS; SUBCUTANEOUS at 00:31

## 2019-01-01 RX ADMIN — INSULIN GLARGINE 10 UNITS: 100 INJECTION, SOLUTION SUBCUTANEOUS at 22:00

## 2019-01-01 RX ADMIN — CHLORHEXIDINE GLUCONATE 15 ML: 1.2 RINSE ORAL at 08:36

## 2019-01-01 RX ADMIN — SODIUM CHLORIDE, SODIUM LACTATE, POTASSIUM CHLORIDE, AND CALCIUM CHLORIDE 1000 ML/HR: 600; 310; 30; 20 INJECTION, SOLUTION INTRAVENOUS at 13:10

## 2019-01-01 RX ADMIN — DEXTROSE MONOHYDRATE 50 ML/HR: 5 INJECTION, SOLUTION INTRAVENOUS at 05:36

## 2019-01-01 RX ADMIN — Medication 30 MCG/MIN: at 02:08

## 2019-01-01 RX ADMIN — IPRATROPIUM BROMIDE AND ALBUTEROL SULFATE 3 ML: .5; 3 SOLUTION RESPIRATORY (INHALATION) at 14:14

## 2019-01-01 RX ADMIN — SODIUM CHLORIDE, SODIUM LACTATE, POTASSIUM CHLORIDE, AND CALCIUM CHLORIDE 1000 ML: 600; 310; 30; 20 INJECTION, SOLUTION INTRAVENOUS at 11:54

## 2019-01-01 RX ADMIN — SODIUM CHLORIDE 1000 ML: 900 INJECTION, SOLUTION INTRAVENOUS at 22:00

## 2019-01-01 RX ADMIN — IPRATROPIUM BROMIDE AND ALBUTEROL SULFATE 3 ML: .5; 3 SOLUTION RESPIRATORY (INHALATION) at 19:56

## 2019-01-01 RX ADMIN — PIPERACILLIN SODIUM,TAZOBACTAM SODIUM 3.38 G: 3; .375 INJECTION, POWDER, FOR SOLUTION INTRAVENOUS at 22:34

## 2019-01-01 RX ADMIN — CHLORHEXIDINE GLUCONATE 15 ML: 1.2 RINSE ORAL at 08:40

## 2019-01-01 RX ADMIN — SODIUM CHLORIDE: 450 INJECTION, SOLUTION INTRAVENOUS at 02:09

## 2019-01-01 RX ADMIN — Medication 30 MCG/MIN: at 06:28

## 2019-01-01 RX ADMIN — VASOPRESSIN 0.04 UNITS/MIN: 20 INJECTION INTRAVENOUS at 09:01

## 2019-01-01 RX ADMIN — Medication 10 ML: at 06:00

## 2019-01-01 RX ADMIN — DEXTROSE MONOHYDRATE: 5 INJECTION, SOLUTION INTRAVENOUS at 11:41

## 2019-01-01 RX ADMIN — AZITHROMYCIN MONOHYDRATE 500 MG: 500 INJECTION, POWDER, LYOPHILIZED, FOR SOLUTION INTRAVENOUS at 23:10

## 2019-01-01 RX ADMIN — SODIUM BICARBONATE 50 MEQ: 84 INJECTION INTRAVENOUS at 01:12

## 2019-01-01 RX ADMIN — CALCIUM GLUCONATE 2 G: 98 INJECTION, SOLUTION INTRAVENOUS at 17:59

## 2019-01-01 RX ADMIN — PIPERACILLIN SODIUM,TAZOBACTAM SODIUM 3.38 G: 3; .375 INJECTION, POWDER, FOR SOLUTION INTRAVENOUS at 09:46

## 2019-01-01 RX ADMIN — VASOPRESSIN 0.04 UNITS/MIN: 20 INJECTION INTRAVENOUS at 23:55

## 2019-01-01 RX ADMIN — DEXTROSE MONOHYDRATE 50 ML/HR: 5 INJECTION, SOLUTION INTRAVENOUS at 14:48

## 2019-01-01 RX ADMIN — DEXTROSE MONOHYDRATE 25 G: 500 INJECTION PARENTERAL at 19:15

## 2019-01-01 RX ADMIN — LORAZEPAM 2 MG: 2 INJECTION INTRAMUSCULAR; INTRAVENOUS at 15:02

## 2019-01-01 RX ADMIN — IPRATROPIUM BROMIDE AND ALBUTEROL SULFATE 3 ML: .5; 3 SOLUTION RESPIRATORY (INHALATION) at 13:38

## 2019-01-01 RX ADMIN — INSULIN LISPRO 7 UNITS: 100 INJECTION, SOLUTION INTRAVENOUS; SUBCUTANEOUS at 12:21

## 2019-01-01 RX ADMIN — HYDROCORTISONE SODIUM SUCCINATE 50 MG: 100 INJECTION, POWDER, FOR SOLUTION INTRAMUSCULAR; INTRAVENOUS at 00:19

## 2019-01-01 RX ADMIN — ETOMIDATE 20 MG: 2 INJECTION, SOLUTION INTRAVENOUS at 21:06

## 2019-01-01 RX ADMIN — PHENYLEPHRINE HYDROCHLORIDE 285 MCG/MIN: 10 INJECTION INTRAVENOUS at 07:26

## 2019-01-01 RX ADMIN — CHLORHEXIDINE GLUCONATE 15 ML: 1.2 RINSE ORAL at 20:50

## 2019-01-01 RX ADMIN — MORPHINE SULFATE 2 MG: 2 INJECTION, SOLUTION INTRAMUSCULAR; INTRAVENOUS at 21:52

## 2019-01-01 RX ADMIN — INSULIN LISPRO 9 UNITS: 100 INJECTION, SOLUTION INTRAVENOUS; SUBCUTANEOUS at 07:09

## 2019-01-01 RX ADMIN — HYDROCORTISONE SODIUM SUCCINATE 50 MG: 100 INJECTION, POWDER, FOR SOLUTION INTRAMUSCULAR; INTRAVENOUS at 08:36

## 2019-01-01 RX ADMIN — Medication 18 MCG/MIN: at 22:09

## 2019-01-01 RX ADMIN — CALCIUM GLUCONATE 2 G: 98 INJECTION, SOLUTION INTRAVENOUS at 12:21

## 2019-01-01 RX ADMIN — FAMOTIDINE 20 MG: 10 INJECTION, SOLUTION INTRAVENOUS at 23:21

## 2019-01-01 RX ADMIN — POTASSIUM CHLORIDE 10 MEQ: 10 INJECTION, SOLUTION INTRAVENOUS at 09:47

## 2019-01-01 RX ADMIN — PIPERACILLIN SODIUM,TAZOBACTAM SODIUM 3.38 G: 3; .375 INJECTION, POWDER, FOR SOLUTION INTRAVENOUS at 12:27

## 2019-01-01 RX ADMIN — Medication 50 MCG/HR: at 21:06

## 2019-01-01 RX ADMIN — PHENYLEPHRINE HYDROCHLORIDE 100 MCG/MIN: 10 INJECTION INTRAVENOUS at 23:01

## 2019-01-01 RX ADMIN — INSULIN LISPRO 5 UNITS: 100 INJECTION, SOLUTION INTRAVENOUS; SUBCUTANEOUS at 11:31

## 2019-01-01 RX ADMIN — IPRATROPIUM BROMIDE AND ALBUTEROL SULFATE 3 ML: .5; 3 SOLUTION RESPIRATORY (INHALATION) at 01:20

## 2019-01-01 RX ADMIN — POTASSIUM CHLORIDE 10 MEQ: 10 INJECTION, SOLUTION INTRAVENOUS at 08:39

## 2019-01-01 RX ADMIN — IPRATROPIUM BROMIDE AND ALBUTEROL SULFATE 3 ML: .5; 3 SOLUTION RESPIRATORY (INHALATION) at 07:37

## 2019-01-01 RX ADMIN — ETOMIDATE 20 MG: 2 INJECTION INTRAVENOUS at 21:06

## 2019-01-01 RX ADMIN — INSULIN LISPRO 10 UNITS: 100 INJECTION, SOLUTION INTRAVENOUS; SUBCUTANEOUS at 17:15

## 2019-01-01 RX ADMIN — HYDROCORTISONE SODIUM SUCCINATE 50 MG: 100 INJECTION, POWDER, FOR SOLUTION INTRAMUSCULAR; INTRAVENOUS at 16:14

## 2019-01-01 RX ADMIN — FENTANYL CITRATE 25 MCG: 50 INJECTION, SOLUTION INTRAMUSCULAR; INTRAVENOUS at 15:11

## 2019-01-01 RX ADMIN — Medication 6 MCG/MIN: at 05:39

## 2019-01-01 RX ADMIN — METHYLPREDNISOLONE SODIUM SUCCINATE 40 MG: 40 INJECTION, POWDER, FOR SOLUTION INTRAMUSCULAR; INTRAVENOUS at 21:14

## 2019-01-01 RX ADMIN — SODIUM CHLORIDE 1000 ML: 900 INJECTION, SOLUTION INTRAVENOUS at 11:49

## 2019-01-01 RX ADMIN — Medication 39 MCG/MIN: at 23:20

## 2019-01-01 RX ADMIN — CHLORHEXIDINE GLUCONATE 15 ML: 1.2 RINSE ORAL at 08:08

## 2019-01-01 RX ADMIN — PIPERACILLIN SODIUM,TAZOBACTAM SODIUM 3.38 G: 3; .375 INJECTION, POWDER, FOR SOLUTION INTRAVENOUS at 10:43

## 2019-01-01 RX ADMIN — Medication 50 MCG/HR: at 16:27

## 2019-01-01 RX ADMIN — IPRATROPIUM BROMIDE AND ALBUTEROL SULFATE 3 ML: .5; 3 SOLUTION RESPIRATORY (INHALATION) at 01:05

## 2019-01-01 RX ADMIN — AZITHROMYCIN MONOHYDRATE 500 MG: 500 INJECTION, POWDER, LYOPHILIZED, FOR SOLUTION INTRAVENOUS at 22:00

## 2019-01-01 RX ADMIN — PIPERACILLIN SODIUM,TAZOBACTAM SODIUM 3.38 G: 3; .375 INJECTION, POWDER, FOR SOLUTION INTRAVENOUS at 22:07

## 2019-01-01 RX ADMIN — Medication 42 MCG/MIN: at 02:45

## 2019-01-01 RX ADMIN — Medication 10 ML: at 06:50

## 2019-01-01 RX ADMIN — PHENYLEPHRINE HYDROCHLORIDE 120 MCG/MIN: 10 INJECTION INTRAVENOUS at 14:22

## 2019-01-01 RX ADMIN — DEXTROSE MONOHYDRATE: 5 INJECTION, SOLUTION INTRAVENOUS at 06:46

## 2019-01-01 RX ADMIN — INSULIN LISPRO 7 UNITS: 100 INJECTION, SOLUTION INTRAVENOUS; SUBCUTANEOUS at 11:39

## 2019-01-01 RX ADMIN — HYDROCORTISONE SODIUM SUCCINATE 50 MG: 100 INJECTION, POWDER, FOR SOLUTION INTRAMUSCULAR; INTRAVENOUS at 08:08

## 2019-01-01 RX ADMIN — Medication 10 ML: at 06:33

## 2019-01-01 RX ADMIN — HEPARIN SODIUM 18 UNITS/KG/HR: 10000 INJECTION, SOLUTION INTRAVENOUS at 23:00

## 2019-01-01 RX ADMIN — DEXTROSE MONOHYDRATE 25 G: 500 INJECTION PARENTERAL at 11:17

## 2019-01-01 RX ADMIN — IPRATROPIUM BROMIDE AND ALBUTEROL SULFATE 3 ML: .5; 3 SOLUTION RESPIRATORY (INHALATION) at 07:45

## 2019-01-01 RX ADMIN — Medication 5 MCG/MIN: at 21:18

## 2019-01-01 RX ADMIN — VANCOMYCIN HYDROCHLORIDE 1250 MG: 10 INJECTION, POWDER, LYOPHILIZED, FOR SOLUTION INTRAVENOUS at 12:48

## 2019-01-01 RX ADMIN — SODIUM CHLORIDE, SODIUM LACTATE, POTASSIUM CHLORIDE, AND CALCIUM CHLORIDE 632 ML: 600; 310; 30; 20 INJECTION, SOLUTION INTRAVENOUS at 12:26

## 2019-01-01 RX ADMIN — PHENYLEPHRINE HYDROCHLORIDE 285 MCG/MIN: 10 INJECTION INTRAVENOUS at 05:13

## 2019-01-01 RX ADMIN — DEXTROSE MONOHYDRATE: 5 INJECTION, SOLUTION INTRAVENOUS at 01:35

## 2019-01-01 RX ADMIN — AZITHROMYCIN MONOHYDRATE 500 MG: 500 INJECTION, POWDER, LYOPHILIZED, FOR SOLUTION INTRAVENOUS at 23:22

## 2019-01-01 RX ADMIN — DEXTROSE MONOHYDRATE 75 ML/HR: 5 INJECTION, SOLUTION INTRAVENOUS at 23:57

## 2019-01-01 RX ADMIN — Medication 10 ML: at 21:10

## 2019-01-01 RX ADMIN — INSULIN LISPRO 7 UNITS: 100 INJECTION, SOLUTION INTRAVENOUS; SUBCUTANEOUS at 07:12

## 2019-01-01 RX ADMIN — Medication 35 MCG/MIN: at 06:32

## 2019-01-01 RX ADMIN — SODIUM BICARBONATE 50 MEQ: 84 INJECTION INTRAVENOUS at 01:00

## 2019-01-01 RX ADMIN — IPRATROPIUM BROMIDE AND ALBUTEROL SULFATE 3 ML: .5; 3 SOLUTION RESPIRATORY (INHALATION) at 20:22

## 2019-01-01 RX ADMIN — CHLORHEXIDINE GLUCONATE 15 ML: 1.2 RINSE ORAL at 21:55

## 2019-01-01 RX ADMIN — Medication 10 ML: at 22:37

## 2019-01-01 RX ADMIN — EPINEPHRINE 1 MG: 0.1 INJECTION INTRACARDIAC; INTRAVENOUS at 00:12

## 2019-01-01 RX ADMIN — Medication 10 ML: at 14:00

## 2019-01-01 RX ADMIN — Medication 10 ML: at 13:17

## 2019-01-01 RX ADMIN — Medication 44 MCG/MIN: at 19:47

## 2019-01-01 RX ADMIN — INSULIN GLARGINE 10 UNITS: 100 INJECTION, SOLUTION SUBCUTANEOUS at 08:09

## 2019-01-01 RX ADMIN — POTASSIUM CHLORIDE 10 MEQ: 10 INJECTION, SOLUTION INTRAVENOUS at 06:48

## 2019-01-01 RX ADMIN — SODIUM BICARBONATE 100 MEQ: 84 INJECTION, SOLUTION INTRAVENOUS at 21:33

## 2019-01-01 RX ADMIN — INSULIN GLARGINE 10 UNITS: 100 INJECTION, SOLUTION SUBCUTANEOUS at 08:44

## 2019-01-01 RX ADMIN — FAMOTIDINE 20 MG: 10 INJECTION, SOLUTION INTRAVENOUS at 21:57

## 2019-01-01 RX ADMIN — INSULIN LISPRO 3 UNITS: 100 INJECTION, SOLUTION INTRAVENOUS; SUBCUTANEOUS at 18:57

## 2019-01-01 RX ADMIN — PIPERACILLIN SODIUM,TAZOBACTAM SODIUM 3.38 G: 3; .375 INJECTION, POWDER, FOR SOLUTION INTRAVENOUS at 21:58

## 2019-01-01 RX ADMIN — CHLORHEXIDINE GLUCONATE 15 ML: 1.2 RINSE ORAL at 20:41

## 2019-01-01 RX ADMIN — DEXTROSE MONOHYDRATE: 5 INJECTION, SOLUTION INTRAVENOUS at 08:30

## 2019-01-01 RX ADMIN — DEXTROSE MONOHYDRATE: 5 INJECTION, SOLUTION INTRAVENOUS at 22:09

## 2019-01-01 RX ADMIN — PHENYLEPHRINE HYDROCHLORIDE 125 MCG/MIN: 10 INJECTION INTRAVENOUS at 11:32

## 2019-01-01 RX ADMIN — CALCIUM GLUCONATE 2 G: 98 INJECTION, SOLUTION INTRAVENOUS at 09:13

## 2019-01-01 RX ADMIN — Medication 40 ML: at 22:00

## 2019-01-01 RX ADMIN — PIPERACILLIN SODIUM,TAZOBACTAM SODIUM 3.38 G: 3; .375 INJECTION, POWDER, FOR SOLUTION INTRAVENOUS at 10:00

## 2019-01-01 RX ADMIN — INSULIN LISPRO 9 UNITS: 100 INJECTION, SOLUTION INTRAVENOUS; SUBCUTANEOUS at 00:41

## 2019-01-01 RX ADMIN — PHENYLEPHRINE HYDROCHLORIDE 300 MCG/MIN: 10 INJECTION INTRAVENOUS at 01:37

## 2019-01-01 RX ADMIN — HYDROCORTISONE SODIUM SUCCINATE 50 MG: 100 INJECTION, POWDER, FOR SOLUTION INTRAMUSCULAR; INTRAVENOUS at 16:24

## 2019-01-01 RX ADMIN — Medication 33 MCG/MIN: at 08:44

## 2019-01-01 RX ADMIN — HYDROCORTISONE SODIUM SUCCINATE 50 MG: 100 INJECTION, POWDER, FOR SOLUTION INTRAMUSCULAR; INTRAVENOUS at 00:21

## 2019-01-01 RX ADMIN — DEXTROSE MONOHYDRATE 50 ML/HR: 5 INJECTION, SOLUTION INTRAVENOUS at 08:31

## 2019-01-01 RX ADMIN — IPRATROPIUM BROMIDE AND ALBUTEROL SULFATE 3 ML: .5; 3 SOLUTION RESPIRATORY (INHALATION) at 01:19

## 2019-01-01 RX ADMIN — SODIUM BICARBONATE 50 MEQ: 84 INJECTION, SOLUTION INTRAVENOUS at 00:12

## 2019-01-01 RX ADMIN — HYDROCORTISONE SODIUM SUCCINATE 50 MG: 100 INJECTION, POWDER, FOR SOLUTION INTRAMUSCULAR; INTRAVENOUS at 08:39

## 2019-01-01 RX ADMIN — DEXTROSE MONOHYDRATE: 5 INJECTION, SOLUTION INTRAVENOUS at 19:12

## 2019-01-01 RX ADMIN — PHENYLEPHRINE HYDROCHLORIDE 300 MCG/MIN: 10 INJECTION INTRAVENOUS at 03:20

## 2019-01-01 RX ADMIN — Medication 10 ML: at 16:52

## 2019-01-01 RX ADMIN — IPRATROPIUM BROMIDE AND ALBUTEROL SULFATE 3 ML: .5; 3 SOLUTION RESPIRATORY (INHALATION) at 21:17

## 2019-01-01 RX ADMIN — SODIUM CHLORIDE: 450 INJECTION, SOLUTION INTRAVENOUS at 22:29

## 2019-01-01 RX ADMIN — PIPERACILLIN SODIUM,TAZOBACTAM SODIUM 3.38 G: 3; .375 INJECTION, POWDER, FOR SOLUTION INTRAVENOUS at 09:03

## 2019-01-01 RX ADMIN — VANCOMYCIN HYDROCHLORIDE 750 MG: 750 INJECTION, POWDER, LYOPHILIZED, FOR SOLUTION INTRAVENOUS at 08:39

## 2019-01-01 RX ADMIN — HYDROCORTISONE SODIUM SUCCINATE 50 MG: 100 INJECTION, POWDER, FOR SOLUTION INTRAMUSCULAR; INTRAVENOUS at 17:16

## 2019-01-01 RX ADMIN — Medication 46 MCG/MIN: at 14:15

## 2019-01-01 RX ADMIN — HEPARIN SODIUM 5000 UNITS: 5000 INJECTION INTRAVENOUS; SUBCUTANEOUS at 05:12

## 2019-01-01 RX ADMIN — AZITHROMYCIN MONOHYDRATE 500 MG: 500 INJECTION, POWDER, LYOPHILIZED, FOR SOLUTION INTRAVENOUS at 23:49

## 2019-01-01 RX ADMIN — INSULIN LISPRO 2 UNITS: 100 INJECTION, SOLUTION INTRAVENOUS; SUBCUTANEOUS at 23:26

## 2019-01-01 RX ADMIN — Medication 10 ML: at 13:04

## 2019-01-01 RX ADMIN — HYDROMORPHONE HYDROCHLORIDE 1.5 MG: 1 INJECTION, SOLUTION INTRAMUSCULAR; INTRAVENOUS; SUBCUTANEOUS at 15:02

## 2019-01-01 RX ADMIN — VASOPRESSIN 0.04 UNITS/MIN: 20 INJECTION INTRAVENOUS at 08:54

## 2019-01-01 RX ADMIN — IPRATROPIUM BROMIDE AND ALBUTEROL SULFATE 3 ML: .5; 3 SOLUTION RESPIRATORY (INHALATION) at 08:10

## 2019-01-01 RX ADMIN — DEXTROSE MONOHYDRATE 75 ML/HR: 5 INJECTION, SOLUTION INTRAVENOUS at 19:03

## 2019-01-01 RX ADMIN — Medication 13 MCG/MIN: at 06:48

## 2019-01-01 RX ADMIN — VANCOMYCIN HYDROCHLORIDE 750 MG: 750 INJECTION, POWDER, LYOPHILIZED, FOR SOLUTION INTRAVENOUS at 11:00

## 2019-01-01 RX ADMIN — PHENYLEPHRINE HYDROCHLORIDE 275 MCG/MIN: 10 INJECTION INTRAVENOUS at 09:12

## 2019-01-01 RX ADMIN — INSULIN LISPRO 3 UNITS: 100 INJECTION, SOLUTION INTRAVENOUS; SUBCUTANEOUS at 18:44

## 2019-01-01 RX ADMIN — IPRATROPIUM BROMIDE AND ALBUTEROL SULFATE 3 ML: .5; 3 SOLUTION RESPIRATORY (INHALATION) at 01:36

## 2019-01-01 RX ADMIN — FAMOTIDINE 20 MG: 10 INJECTION, SOLUTION INTRAVENOUS at 22:07

## 2019-01-01 RX ADMIN — IPRATROPIUM BROMIDE AND ALBUTEROL SULFATE 3 ML: .5; 3 SOLUTION RESPIRATORY (INHALATION) at 20:55

## 2019-01-01 RX ADMIN — Medication 52 MCG/MIN: at 16:58

## 2019-01-01 RX ADMIN — VASOPRESSIN 0.04 UNITS/MIN: 20 INJECTION INTRAVENOUS at 16:33

## 2019-01-01 RX ADMIN — GLYCOPYRROLATE 0.2 MG: 0.2 INJECTION, SOLUTION INTRAMUSCULAR; INTRAVENOUS at 15:02

## 2019-01-01 RX ADMIN — Medication 30 MCG/MIN: at 21:26

## 2019-01-01 RX ADMIN — IPRATROPIUM BROMIDE AND ALBUTEROL SULFATE 3 ML: .5; 3 SOLUTION RESPIRATORY (INHALATION) at 15:09

## 2019-01-01 RX ADMIN — PIPERACILLIN SODIUM,TAZOBACTAM SODIUM 3.38 G: 3; .375 INJECTION, POWDER, FOR SOLUTION INTRAVENOUS at 21:14

## 2019-01-01 RX ADMIN — Medication 29 MCG/MIN: at 10:34

## 2019-01-01 RX ADMIN — IPRATROPIUM BROMIDE AND ALBUTEROL SULFATE 3 ML: .5; 3 SOLUTION RESPIRATORY (INHALATION) at 07:53

## 2019-01-01 RX ADMIN — Medication 32 MCG/MIN: at 09:06

## 2019-01-01 RX ADMIN — DEXTROSE MONOHYDRATE 25 G: 500 INJECTION PARENTERAL at 01:30

## 2019-01-01 RX ADMIN — Medication 40 ML: at 14:00

## 2019-01-01 RX ADMIN — HYDROCORTISONE SODIUM SUCCINATE 50 MG: 100 INJECTION, POWDER, FOR SOLUTION INTRAMUSCULAR; INTRAVENOUS at 23:26

## 2019-01-01 RX ADMIN — EPINEPHRINE 1 MG: 0.1 INJECTION INTRACARDIAC; INTRAVENOUS at 21:28

## 2019-01-01 RX ADMIN — Medication 10 ML: at 22:07

## 2019-01-01 RX ADMIN — SODIUM CHLORIDE: 450 INJECTION, SOLUTION INTRAVENOUS at 11:41

## 2019-01-01 RX ADMIN — HYDROCORTISONE SODIUM SUCCINATE 50 MG: 100 INJECTION, POWDER, FOR SOLUTION INTRAMUSCULAR; INTRAVENOUS at 08:40

## 2019-01-01 RX ADMIN — CHLORHEXIDINE GLUCONATE 15 ML: 1.2 RINSE ORAL at 21:13

## 2019-01-01 RX ADMIN — POTASSIUM CHLORIDE 10 MEQ: 10 INJECTION, SOLUTION INTRAVENOUS at 11:18

## 2019-01-01 RX ADMIN — Medication 125 MCG/HR: at 18:07

## 2019-01-01 RX ADMIN — INSULIN LISPRO 7 UNITS: 100 INJECTION, SOLUTION INTRAVENOUS; SUBCUTANEOUS at 06:28

## 2019-01-01 RX ADMIN — Medication 6 MCG/MIN: at 20:08

## 2019-01-01 RX ADMIN — FAMOTIDINE 20 MG: 10 INJECTION, SOLUTION INTRAVENOUS at 22:32

## 2019-01-01 RX ADMIN — HEPARIN SODIUM 9 UNITS/KG/HR: 10000 INJECTION, SOLUTION INTRAVENOUS at 06:53

## 2019-01-01 RX ADMIN — MIDAZOLAM 2 MG: 1 INJECTION INTRAMUSCULAR; INTRAVENOUS at 21:06

## 2019-01-01 RX ADMIN — MIDAZOLAM HYDROCHLORIDE 2 MG: 1 INJECTION, SOLUTION INTRAMUSCULAR; INTRAVENOUS at 21:06

## 2019-08-01 PROBLEM — J69.0 ASPIRATION PNEUMONIA (HCC): Status: ACTIVE | Noted: 2019-01-01

## 2019-08-01 NOTE — ED PROVIDER NOTES
82 y.o. male with past medical history significant for HTN, diabetes, CKD, and COPD who presents from [facility] via EMS with chief complaint of SOB. History is largely provided by EMS and through his medical records. The patient normally gets care at [facility]. The patient was admitted at [facility] from 6/26/19-7/3/19 after a fall, also found to have aspiration PNA and was treated with Augmentin prior to discharge. He then was discharged to [facility] for rehab. Per records, last night, the patient's RA sats were 85% and he was placed on 2L NC. However, this morning his SOB continued and the facility also reported lethargy. When first responders arrived on the scene today, they found RA sats of 72% and diffuse wheezing. They started a nebulizer treatment. When the EMS unit arrived, the patient's RA sats were 88%, which increased en route with a NRB mask with NC O2 up to 92%. The patient is A&Ox3 at baseline. Patient denies previous ICU admissions for his breathing, and denies previous intubations. There are no other acute medical concerns at this time. Full history, physical exam, and ROS unable to be obtained due to:  Respiratory distress. Social hx: Nonsmoker; Current EtOH use Note written by Dominguez Whitney, as dictated by Marisol Akbar, DO 11:48 AM 
 
The history is provided by the patient, the EMS personnel and medical records. No  was used. Past Medical History:  
Diagnosis Date  COPD  Diabetes ([facility] [address]  Hypertension  Other ill-defined conditions(799.89)   
 glaucoma History reviewed. No pertinent surgical history. History reviewed. No pertinent family history. Social History Socioeconomic History  Marital status: SINGLE Spouse name: Not on file  Number of children: Not on file  Years of education: Not on file  Highest education level: Not on file Occupational History  Not on file Social Needs  Financial resource strain: Not on file  Food insecurity:  
  Worry: Not on file Inability: Not on file  Transportation needs:  
  Medical: Not on file Non-medical: Not on file Tobacco Use  Smoking status: Never Smoker Substance and Sexual Activity  Alcohol use: Yes  Drug use: No  
 Sexual activity: Not on file Lifestyle  Physical activity:  
  Days per week: Not on file Minutes per session: Not on file  Stress: Not on file Relationships  Social connections:  
  Talks on phone: Not on file Gets together: Not on file Attends Tenriism service: Not on file Active member of club or organization: Not on file Attends meetings of clubs or organizations: Not on file Relationship status: Not on file  Intimate partner violence:  
  Fear of current or ex partner: Not on file Emotionally abused: Not on file Physically abused: Not on file Forced sexual activity: Not on file Other Topics Concern  Not on file Social History Narrative  Not on file ALLERGIES: Patient has no known allergies. Review of Systems Unable to perform ROS: Severe respiratory distress Vitals:  
 08/01/19 1234 08/01/19 1240 08/01/19 1250 08/01/19 1300 BP: 107/50 92/51 104/52 98/73 Pulse: (!) 114 (!) 109 (!) 106 (!) 108 Resp: (!) 38 (!) 42 (!) 39 (!) 36 Temp:      
SpO2: (!) 80% (!) 68%  96% Weight:      
      
 
Physical Exam  
Constitutional: He appears cachectic. He appears ill. He appears distressed. Nasal cannula and face mask in place. Arrives on NRB mask. Ill appearing. Elderly and cachetic. HENT:  
Head: Normocephalic and atraumatic. Mouth/Throat: Mucous membranes are dry. Dry MM. Eyes: Conjunctivae are normal.  
Cardiovascular: Intact distal pulses. Tachycardia present. Tachycardic. Pulmonary/Chest: Accessory muscle usage present. Tachypnea noted.  He is in respiratory distress. He has decreased breath sounds. He has wheezes. He has rhonchi. Severe respiratory distress. Increased work of breathing. Tachypneic. Greatly decreased breath sounds on the right with wheezing and rhonchi. Abdominal: Soft. He exhibits no distension. Musculoskeletal: He exhibits no deformity. Neurological: He is alert. He has normal strength. Slurred speech. Equal strength in all extremities. Skin: Skin is warm and dry. Psychiatric: His behavior is normal. His speech is slurred. Nursing note and vitals reviewed. Note written by Claudeen Pore, Scribe, as dictated by Nicolas Ramos DO 11:48 AM 
 
MDM Number of Diagnoses or Management Options Acute renal failure, unspecified acute renal failure type [facility]): HCAP (healthcare-associated pneumonia): Hypernatremia:  
Macrocytic anemia:  
Severe sepsis ([address]): Uremia:  
Diagnosis management comments: Severe sepsis w/ hypoxia and ARF. Pt wished to be DNR, but was agreeable to bipap and CVC (if needed). Sepsis protocol initiated immediately and he improved significantly after IVF and bipap. Decided to hold off on CVC given improvement in BP. Hypernatremia likely 2/2 dehydration, plan to give 2nd fluid bolus and admit for his severe sepsis. Hospitalist Malu for Admission 2:22 PM 
 
ED Room Number: DC39/80 Patient Name and age:  Sonia Ramirez 82 y.o.  male Working Diagnosis: HCAP (healthcare-associated pneumonia)  (primary encounter diagnosis) Acute renal failure, unspecified acute renal failure type ([address] Uremia Hypernatremia Severe sepsis ([address] Macrocytic anemia Readmission: no 
Isolation Requirements:  no 
Recommended Level of Care:  ICU Code Status:  Do Not Resuscitate Department:[facility] Adult ED - [phone] Other: improving, but critically ill Amount and/or Complexity of Data Reviewed Clinical lab tests: ordered Tests in the radiology section of CPT®: ordered Tests in the medicine section of CPT®: ordered Discussion of test results with the performing providers: yes Obtain history from someone other than the patient: yes Review and summarize past medical records: yes Discuss the patient with other providers: yes Independent visualization of images, tracings, or specimens: yes Risk of Complications, Morbidity, and/or Mortality Presenting problems: high Diagnostic procedures: low Management options: moderate Critical Care Total time providing critical care: 30-74 minutes (42) Patient Progress Patient progress: improved Procedures 11:39 AM 
RT at bedside to place the patient on BiPAP. 11:40 AM  
Spoke with patient. He states at this time, he would not want to be intubated or have CPR. He states he has no family local, but has a daughter listed on his paperwork. 11:42 AM  
RN calling a Code Sepsis. 12:19 PM 
BP remains significantly hypotensive. RNs and tech having difficulty obtaining PIV access. Plan to place a central line. ED EKG interpretation: 
Rhythm: sinus tachycardia; and regular . Rate (approx.): 116 bpm; Long QTc at 475 ms; Normal axis; No ST changes. Note written by Dominguez Rodriguez, as dictated by Meredith Toussaint DO 12:25 PM 
 
12:30 PM 
Patient's daughter is now at bedside. 12:53 PM 
Updated the patient and family at bedside.

## 2019-08-01 NOTE — ED TRIAGE NOTES
Patient presents from [facility] with complaints of increase difficulty breathing that started 1.5 hours ago. Patient is at facility because of fall. Patient received 2 breathing treatments en route. Per EMS patient was given \"subcutaneous fluids\" yesterday to his L upper arm

## 2019-08-01 NOTE — H&P
[address] [place] [address] HISTORY AND PHYSICAL Name:  Nathalie Hill 
MR#:  [id number] :  1935 ACCOUNT #:  [de-identified] ADMIT DATE:  2019 PRIMARY CARE PHYSICIAN:  Anjel Alcaraz MD 
 
PRESENTING COMPLAINT:  
 
 Transferred from [address] [place] [address] due to shortness of breath. HISTORY OF PRESENTING ILLNESS:   
 
The patient is an 53-year-old gentleman who has previous history significant for hypertension, diabetes, chronic kidney disease, and COPD. There is no family available at this time. I did call [facility] where the nurse tells me that he has been there for the last 3 weeks. The patient has previous history of aspiration pneumonia requiring admission at [facility] and has been on nectar-thickened liquids since then. He was found to be hypernatremic yesterday. His sodium was 168. He was given D5 water 1 liter at Nursing home. The patient, this morning, was found to be hypoxic. His saturation was as low as 85% at [facility]. However, this morning, his shortness of breath  worsened. He was more lethargic and has diffuse wheezing. EMS gave him nebulizer treatment and put him on nonrebreather mask. In the emergency room, the patient's workup showed elevated white count, and chest x-ray was suggestive of pneumonia on the right side. He was given IV fluids per sepsis protocol  and antibiotics. Initially, he was put on BiPAP. Unfortunately, the patient is not able to give me any meaningful history. According to [address] [place] [address], he is not on dialysis but has CKD. PAST MEDICAL HISTORY:   
Significant for: 1. Hypertension. 2.  Gastroesophageal reflux disease. 3.  Type 2 diabetes. 4.  History of aspiration pneumonia. 5.  Hyperlipidemia. 6.  Prostate cancer. 7.  Gout. 8.  Legal blindness. 9.  Chronic anemia. CODE STATUS:   
 
According to the ER physician, he told him he is DNR. However, according to the nursing home, he wants to be full code.  Patient tells me he wants to be full code. MEDICATIONS:   
 
His current medications prior to admission included metoprolol tartrate 25 mg half tablet twice daily, Humulin 70/30 of 18 units once a day daily, ferrous sulfate 325 mg twice daily, thiamine 100 mg daily, sodium bicarbonate 650 mg b.i.d., albuterol every 6 hours as needed, allopurinol 756 mg daily, folic acid 1 mg daily, multivitamin daily, and Tylenol. The patient completed a 10-day course of Augmentin which he finished on the 07/20. FAMILY HISTORY:   
 
Not available. REVIEW OF SYSTEMS:   
 
Cannot be done because of the patient's condition. ALLERGIES:  
 
 NO KNOWN DRUG ALLERGIES. PHYSICAL EXAMINATION: 
 
GENERAL:  The patient is an 44-year-old chronically ill-looking gentleman who is in respiratory distress. VITAL SIGNS:  Temperature 98.8, blood pressure was initially 69/31, now it is 108/64, respiratory rate is 30. HEENT:  Reveals pupils equally reacting to light and accommodation. NECK:  Supple. There is no lymphadenopathy or JVD. CHEST:  Reveals rhonchi at the right side. CARDIOVASCULAR SYSTEM:  S1 and S2 regular. No murmur. No S3. 
ABDOMEN:  Reveals no tenderness, no guarding, no rigidity. Bowel sounds are active. EXTREMITIES:  No pedal edema. CNS:  Reveals the patient is alert, oriented x1. He is moving all his extremities. Plantars are downgoing. Detailed examination is not possible due to his mental status at this time. LABORATORY DATA: 
 
  Lab work done in ER revealed a white count of 19.3, hemoglobin of 8.2, hematocrit is 29.2, MCV is 104.7, platelet count is 276,847; 83% segs. Chemistries revealed sodium 163, potassium 5.1, chloride is 129, bicarb is 21, gap of 13, glucose 115, BUN is 103, creatinine is 4.7, calcium is 8.8, bilirubin is 0.4, protein 7.9, albumin is 1.5, globulin is 6.4, ALT 17, AST 33, alk phos is 107.   Urinalysis revealed moderate epithelial cells, large amount of leukocyte esterase, 100 mg/dL of protein in the urine. Lactic acid is 7.08. EKG shows sinus tachycardia with biatrial enlargement. Chest x-ray shows interstitial infiltrates over the right lung with mild interstitial prominence on the left. CT scan of the chest is pending. ASSESSMENT AND PLAN:   
 
The patient is an 82-year-old gentleman who has previous history significant for chronic kidney disease stage IV, prostate cancer, aspiration pneumonia, diabetes, and gout. He is admitted with: 
 
1. Severe sepsis probably due to aspiration pneumonia. The patient will be admitted to ICU. We will give him wide-spectrum antibiotic and fluids. Already received sepsis protocol fluids in ER. 2.  Severe hypernatremia probably due to not getting enough fluid as he is on nectar-thickened liquid which he was not taking. We will give him hypotonic fluids. 3.  Consult Nephrology. 4.  Acute hypoxic respiratory failure due to aspiration pneumonia. We will get a CT and the patient will be put on high-flow oxygen. He needs deep suction. May need intubation . 5. Hypotension probably due to sepsis. The patient will be given IV fluids. 6.  Obtain appropriate culture. 7.  History of diabetes. We will monitor blood sugar. 8.  History of hypertension. We will hold antihypertensive medication because of the patient's hypotension. 9.  History of chronic obstructive pulmonary disease. We will give him DuoNebs. 10.  Deep vein thrombosis prophylaxis. 11.  Serial lactate. 12.  Serial electrolytes. 13. Duonebs ordered. Overall, the patient's prognosis is poor as he looks extremely sick. Rox Medeirso MD 
 
 
[id number] D:  08/01/2019 14:00 
T:  08/01/2019 14:09 
JOB #:  [id number]

## 2019-08-01 NOTE — PROGRESS NOTES
Pharmacist Note - Vancomycin Dosing Consult provided for this 82 y.o. male for indication of PNA (HAP). Antibiotic regimen(s): vancomycin, zosyn Recent Labs 19 
1205 WBC 19.3*  
CREA 4.71* * Frequency of BMP: q8 hours (MD ordered) Height: 177.8 cm Weight: 54.4 kg Est CrCl: 9.5 (SARAH) ml/min; UO: n/a Temp (24hrs), Av.9 °F (37.2 °C), Min:98.8 °F (37.1 °C), Max:98.9 °F (37.2 °C) Cultures: 
-  Blood - pending -  Urine - pending Goal trough = 15 - 20 mcg/mL Therapy was initiated with a loading dose of 1250 mg IV x 1. Due to SARAH - will order maintenance dose once renal function stabilizes

## 2019-08-01 NOTE — PROGRESS NOTES
Admission Medication Reconciliation: 
 
Information obtained from:  Transfer documents, spoke with RUFINA DUGAN (who administered meds today) Comments/Recommendations:  
Patient is unable to participate in interview at this time due to clinical status. Notes: 
Beta blockers: taking tartrate form twice daily AND succinate form once daily. BP prior to departure from facility was reported as 91/45 by LPN. Recommendation: succinate form is preferred, d/c tartrate form, re-evaluate need prior to discharge 2. Albuterol: has received no albuterol tx at facility x 2 days \"at least\" per LPN 3. Controller inhalers (see below): none noted on his medication list from facility Recommendation: please review, re-order as needed (COPD noted in history) Medication changes (since last review): Added: ALL Removed: 
Glipizide Glyburide Nexium Proair Serevent Tiotropium Thank you for allowing me to participate in the care of your patient. Melissa Boston PharmD, RN #[id number] Allergies:  Patient has no known allergies. Significant PMH/Disease States:  
Past Medical History:  
Diagnosis Date COPD Diabetes ([facility] [address] Hypertension Other ill-defined conditions(799.89)   
 glaucoma Chief Complaint for this Admission: Chief Complaint Patient presents with Shortness of Breath Prior to Admission Medications:  
Prior to Admission Medications Prescriptions Last Dose Informant Patient Reported? Taking? Ferrous Fumarate 325 mg (106 mg iron) tab  Transfer Papers Yes Yes Sig: Take 325 mg by mouth two (2) times a day. acetaminophen (TYLENOL) 325 mg tablet  Transfer Papers Yes Yes Sig: Take 325 mg by mouth every eight (8) hours as needed for Pain or Fever. albuterol (PROVENTIL VENTOLIN) 2.5 mg /3 mL (0.083 %) nebu  Transfer Papers Yes Yes Si.5 mg by Nebulization route every six (6) hours as needed. Indications: chronic obstructive lung disease allopurinol (ZYLOPRIM) 100 mg tablet  Transfer Papers Yes Yes Sig: Take 100 mg by mouth daily. Indications: treatment to prevent acute gout attack  
cholecalciferol (VITAMIN D3) 1,000 unit tablet  Transfer Papers Yes Yes Sig: Take 1,000 Units by mouth daily. cyanocobalamin (VITAMIN B12) 500 mcg tablet  Transfer Papers Yes Yes Sig: Take 500 mcg by mouth daily. folic acid (FOLVITE) 1 mg tablet  Transfer Papers Yes Yes Sig: Take 1 mg by mouth daily. insulin NPH/insulin regular (HUMULIN 70/30 U-100 INSULIN) 100 unit/mL (70-30) injection 2019 at Unknown time Transfer Papers Yes Yes Si Units by SubCUTAneous route daily (with breakfast). Indications: type 2 diabetes mellitus  
insulin NPH/insulin regular (HUMULIN 70/30 U-100 INSULIN) 100 unit/mL (70-30) injection 2019 at Unknown time Transfer Papers Yes Yes Sig: 10 Units by SubCUTAneous route daily (after lunch). Indications: type 2 diabetes mellitus  
insulin lispro (HUMALOG U-100 INSULIN) 100 unit/mL injection 2019 at Unknown time Transfer Papers Yes Yes Sig: by SubCUTAneous route. SSI  
metoprolol succinate (TOPROL-XL) 50 mg XL tablet 2019 at 0730 Transfer Papers Yes Yes Sig: Take 50 mg by mouth daily. Indications: high blood pressure  
metoprolol tartrate (LOPRESSOR) 25 mg tablet 2019 at 0730 Transfer Papers Yes Yes Sig: Take 12.5 mg by mouth two (2) times a day. Indications: high blood pressure  
multivitamin (ONE A DAY) tablet  Transfer Papers Yes Yes Sig: Take 1 Tab by mouth daily. sodium bicarbonate 650 mg tablet  Transfer Papers Yes Yes Sig: Take 650 mg by mouth two (2) times a day. thiamine HCL (B-1) 100 mg tablet  Transfer Papers Yes Yes Sig: Take 100 mg by mouth daily. Facility-Administered Medications: None

## 2019-08-01 NOTE — ED NOTES
Patient's daughter at bedside. Patient's daughter reports the patient fell out of the bed at [facility] on 7/29. Per patient's daughter he was not taken to the hospital at the time that he fell out of the bed

## 2019-08-01 NOTE — ROUTINE PROCESS
TRANSFER - OUT REPORT: 
 
Verbal report given to Kaycee ARGUELLO (name) on Sabrina Salas  being transferred to ICU 8(unit) for routine progression of care Report consisted of patients Situation, Background, Assessment and  
Recommendations(SBAR). Information from the following report(s) SBAR, Kardex, ED Summary, [facility] and Recent Results was reviewed with the receiving nurse. Lines:  
Peripheral IV 08/01/19 Right Forearm (Active) Site Assessment Clean, dry, & intact 8/1/2019 11:34 AM  
Phlebitis Assessment 0 8/1/2019 11:34 AM  
Infiltration Assessment 0 8/1/2019 11:34 AM  
Dressing Status Clean, dry, & intact 8/1/2019 11:34 AM  
   
Peripheral IV 08/01/19 Left External jugular (Active) Site Assessment Clean, dry, & intact 8/1/2019 12:25 PM  
Phlebitis Assessment 0 8/1/2019 12:25 PM  
Infiltration Assessment 0 8/1/2019 12:25 PM  
Dressing Status Clean, dry, & intact 8/1/2019 12:25 PM  
  
 
Opportunity for questions and clarification was provided. Patient transported with: 
 Monitor Registered Nurse Patient going to CT with RN and then up to ICU

## 2019-08-01 NOTE — ED NOTES
Code sepsis called Patient's respirations 44  Patient's altered and being placed on Bipap at this time

## 2019-08-01 NOTE — CONSULTS
NEPHROLOGY CONSULT NOTE Patient: Timmy Del Rio MRN: [id number]  PCP: Unknown, Provider :     1935  Age:   82 y.o. Sex:  male Referring physician: Mervat Ashford DO Reason for consultation: 82 y.o. male with Aspiration pneumonia ([facility] [S02.7] complicated by SARAH Admission Date: 2019 11:28 AM  LOS: 0 days ASSESSMENT and PLAN :  
SARAH: 
- 2/2 volume depletion 
- renal U/S 
- cont with IVF 
- place mix catheter 
- daily labs Hypernatremia: 
- form lack of free water intake 
- agree with D5W for now along with IV LR 
- repeat labs in AM 
 
Hypoxia: 
- ? Aspiration PNA 
- hx of recent PNA at [facility] 
- on broad spectrum abx Hypovolemia: 
- IVF as above Hypotension: 
- 2/2 hypovolemia 
- sepsis w/u per hospitalist 
 
DM2: 
- per hospitalist 
  
 
Active Problems / Assessment AAActive  :  
Principal Problem: 
  Aspiration pneumonia ([facility] (2019) Subjective: HPI: Timmy Del Rio is a 82 y.o.  male who has been admitted to the hospital for SOB, AMS. He has a reported hx of HTN, DM2, CKD, unclear baseline Cr. His records are all at [facility] and he has seen a nephrologist at [facility] in the past.  [place] [facility] [place] [facility] for a fall and aspiration PNA. He was found to be hypoxic and lethargic and his NH asn was transferred to [facility]. He was on nectar thick liquids and it is unclear if he aspirated. Cr 4.7, Na 163 on labs today. CXR showing R lung infiltrates. He is lethargic and agitated and unable to provide any history otherwise. Past Medical Hx:  
Past Medical History:  
Diagnosis Date  COPD  Diabetes ([facility]  Hypertension  Other ill-defined conditions(799.89)   
 glaucoma Past Surgical Hx: 
 History reviewed. No pertinent surgical history. Medications: 
Prior to Admission medications Medication Sig Start Date End Date Taking? Authorizing Provider metoprolol tartrate (LOPRESSOR) 25 mg tablet Take 12.5 mg by mouth two (2) times a day. Indications: high blood pressure 7/30/19  Yes Provider, Historical  
insulin lispro (HUMALOG U-100 INSULIN) 100 unit/mL injection by SubCUTAneous route. SSI 7/24/19  Yes Provider, Historical  
insulin NPH/insulin regular (HUMULIN 70/30 U-100 INSULIN) 100 unit/mL (70-30) injection 18 Units by SubCUTAneous route daily (with breakfast). Indications: type 2 diabetes mellitus 7/17/19  Yes Provider, Historical  
insulin NPH/insulin regular (HUMULIN 70/30 U-100 INSULIN) 100 unit/mL (70-30) injection 10 Units by SubCUTAneous route daily (after lunch). Indications: type 2 diabetes mellitus 7/16/19  Yes Provider, Historical  
Ferrous Fumarate 325 mg (106 mg iron) tab Take 325 mg by mouth two (2) times a day. 7/10/19 9/8/19 Yes Provider, Historical  
metoprolol succinate (TOPROL-XL) 50 mg XL tablet Take 50 mg by mouth daily. Indications: high blood pressure 7/6/19  Yes Provider, Historical  
thiamine HCL (B-1) 100 mg tablet Take 100 mg by mouth daily. 7/6/19  Yes Provider, Historical  
sodium bicarbonate 650 mg tablet Take 650 mg by mouth two (2) times a day. 7/5/19  Yes Provider, Historical  
albuterol (PROVENTIL VENTOLIN) 2.5 mg /3 mL (0.083 %) nebu 2.5 mg by Nebulization route every six (6) hours as needed. Indications: chronic obstructive lung disease   Yes Provider, Historical  
allopurinol (ZYLOPRIM) 100 mg tablet Take 100 mg by mouth daily. Indications: treatment to prevent acute gout attack 7/4/19  Yes Provider, Historical  
cholecalciferol (VITAMIN D3) 1,000 unit tablet Take 1,000 Units by mouth daily. 7/4/19  Yes Provider, Historical  
folic acid (FOLVITE) 1 mg tablet Take 1 mg by mouth daily. 7/4/19  Yes Provider, Historical  
multivitamin (ONE A DAY) tablet Take 1 Tab by mouth daily. Yes Provider, Historical  
cyanocobalamin (VITAMIN B12) 500 mcg tablet Take 500 mcg by mouth daily.  7/4/19  Yes Provider, Historical  
 acetaminophen (TYLENOL) 325 mg tablet Take 325 mg by mouth every eight (8) hours as needed for Pain or Fever. Yes Provider, Historical  
 
 
No Known Allergies Social Hx:  reports that he has never smoked. He does not have any smokeless tobacco history on file. He reports that he drinks alcohol. He reports that he does not use drugs. History reviewed. No pertinent family history. Review of Systems: A twelve point review of system was performed today. Pertinent positives and negatives are mentioned in the HPI. The reminder of the ROS is negative and noncontributory. Objective:   
Vitals:   
Vitals:  
 08/01/19 1520 08/01/19 1530 08/01/19 1630 08/01/19 1700 BP:  (!) 120/95 112/63 Pulse:  99 (!) 104 Resp:  (!) 31 (!) 37 Temp:   98.9 °F (37.2 °C) SpO2: 99%  97% Weight:      
Height:    5' 10\" (1.778 m) I&O's:  No intake/output data recorded. Visit Vitals /63 (BP 1 Location: Right arm, BP Patient Position: At rest) Pulse (!) 104 Temp 98.9 °F (37.2 °C) Resp (!) 37 Ht 5' 10\" (1.778 m) Wt 54.4 kg (120 lb) SpO2 97% BMI 17.22 kg/m² Physical Exam: 
General:confused, frail, cachectic HEENT: EOMI, dry MM, NRB on 
Neck:Supple,no mass palpable Lungs : Coarse breath sounds b/l CVS: RRR, S1 S2 normal, No rub, no LE edema Abdomen: Soft, Non tender, No hepatosplenomegaly, bowel sounds present Extremities: No cyanosis, No clubbing Skin: No rash or lesions. Lymph nodes: No palpable nodes MS: No joint swelling, erythema, warmth Neurologic: confused Psych: unable to assess Laboratory Results: 
 
Lab Results Component Value Date  (H) 08/01/2019  (HH) 08/01/2019  
 K 5.1 08/01/2019  (H) 08/01/2019 CO2 21 08/01/2019 Lab Results Component Value Date  (H) 08/01/2019  
 K 5.1 08/01/2019 Lab Results Component Value Date WBC 19.3 (H) 08/01/2019 RBC 2.79 (L) 08/01/2019 HGB 8.2 (L) 08/01/2019 HCT 29.2 (L) 08/01/2019 .7 (H) 08/01/2019 MCH 29.4 08/01/2019 RDW 18.8 (H) 08/01/2019  08/01/2019 No results found for: PTH, PHOS Urine dipstick:  
Lab Results Component Value Date/Time Color YELLOW/STRAW 08/01/2019 12:45 PM  
 Appearance TURBID (A) 08/01/2019 12:45 PM  
 Specific gravity 1.019 08/01/2019 12:45 PM  
 pH (UA) 6.0 08/01/2019 12:45 PM  
 Protein 100 (A) 08/01/2019 12:45 PM  
 Glucose NEGATIVE  08/01/2019 12:45 PM  
 Ketone NEGATIVE  08/01/2019 12:45 PM  
 Bilirubin NEGATIVE  08/01/2019 12:45 PM  
 Urobilinogen 1.0 08/01/2019 12:45 PM  
 Nitrites NEGATIVE  08/01/2019 12:45 PM  
 Leukocyte Esterase LARGE (A) 08/01/2019 12:45 PM  
 Epithelial cells MODERATE (A) 08/01/2019 12:45 PM  
 Bacteria NEGATIVE  08/01/2019 12:45 PM  
 WBC 0-4 08/01/2019 12:45 PM  
 RBC 0-5 08/01/2019 12:45 PM  
 
 
I have reviewed the following: All pertinent labs, microbiology data, radiology imaging for my assessment Thank you for allowing us to participate in the care of this patient. We will follow patient. Please dont hesitate to call with any questions Shahla Pop MD 
8/1/2019 [address] [place] [address], [address] [facility] Phone - [phone] Fax - [phone] 
[email or web address]. [email or web address]

## 2019-08-02 PROBLEM — I46.9 CARDIAC ARREST (HCC): Status: ACTIVE | Noted: 2019-01-01

## 2019-08-02 NOTE — PROGRESS NOTES
Responded to Code Nasreen [id number]  Consulted with nurse. No family present at this time. Provided support to staff. Alvina Ceuvas of  Availability. Chaplains will follow as able and/or needed. Deann Lai,  Intern, MDiv 
[phone]

## 2019-08-02 NOTE — PROGRESS NOTES
PULMONARY/Critical Care/SLEEP MEDICINE  [facility] Name: David Cadena : 1935 MRN: [id number] Date: 2019 9:11 PM  
[]I have reviewed the flowsheet and previous days notes. [x]The patient is unable to give any meaningful history or review of systems because the patient is: 
[]Intubated [x]disoriented []Sedated   
[]Unresponsive []The patient is critically ill on     
[]Mechanical ventilation []Pressors  
[x]BiPAP [] CTSP by hospitalist for resp failure,  dw pt daughter on phone, she could give no detail of his medical history, reports [place] [facility] [place], now at [facility], could tell me he had prostate cancer many years ago, that's all, not aware of current medical issues, she did spell the word subcutaneous for some reason. He was sent in with resp distress, pneumonia. I found him in icu on bipap with rr over 35 and mumbling, easily agitated, pulling at fm. I proceeded with intubation. BP now low ROS:Review of systems not obtained due to patient factors. Vital Signs:   
Visit Vitals /66 Pulse (P) 88 Temp 98.3 °F (36.8 °C) Resp (!) (P) 32 Ht 5' 10\" (1.778 m) Wt 56.3 kg (124 lb 1.9 oz) SpO2 94% BMI 17.81 kg/m² O2 Device: BIPAP Temp (24hrs), Av.7 °F (37.1 °C), Min:98.3 °F (36.8 °C), Max:98.9 °F (37.2 °C) Intake/Output:  
Last shift:      No intake/output data recorded. Last 3 shifts: 701 -  1900 In: 350 [I.V.:350] Out: - Intake/Output Summary (Last 24 hours) at 2019 Last data filed at 2019 7453 Gross per 24 hour Intake 350 ml Output  Net 350 ml Physical Exam: 
 
General: [x]Intubated/sedated []No acute distress [] []Ill appearing [] [] HEENT: []Icteric []PERRL [x]opacified cornea  
 []Anicteric Mucosa:[]moist   [x]dry [x] edentulous Resp: []Wheeze []Clear to ascultation bilaterally [x]Accessory muscle use [x]Rales []Chest tube:  []Air leak [] []Ronchi []Crepitus [] []Coarse bilateral ventilator breath sounds CV: [x]Regular []Tachycardia [] []Irregular []Bradycardic []  
 []Murmur []S3 [] []Edema []S4 []  
  
GI: [x]Soft  []NG Tube Bowel sounds: [x]present []absent []Firm []PEG [] []Distended  []  
  
: []Shell []Hematuria [] []Clear urine []Edema [] MSK:  
 []SCDs [x]Edema lue [x]muscle wasting []No deformities [] []  
  
Skin: [x]Warm [x]Dry  [] []Cool []Moist [] []Hot  []Diaphoretic [] []Rash  []Cyanosis [] N-psych: []Sedated  [x]Agitated [] []Alert  Follows commands: 
 []Yes  [x]No [] Devices: []ET-Tube []Tracheostomy []Chest tube: Air leak? []Y/[]N  
 []Central Line []PA Catheter []  
 []PICC [] [] DATA:  
Current Facility-Administered Medications Medication Dose Route Frequency  sodium chloride (NS) flush 5-10 mL  5-10 mL IntraVENous PRN  
 lactated Ringers infusion  1,000 mL/hr IntraVENous CONTINUOUS  
 sodium chloride (NS) flush 5-40 mL  5-40 mL IntraVENous Q8H  
 sodium chloride (NS) flush 5-40 mL  5-40 mL IntraVENous PRN  
 heparin (porcine) injection 5,000 Units  5,000 Units SubCUTAneous Q12H  
 dextrose 5% infusion  75 mL/hr IntraVENous CONTINUOUS  
 albuterol-ipratropium (DUO-NEB) 2.5 MG-0.5 MG/3 ML  3 mL Nebulization Q6H RT  
 piperacillin-tazobactam (ZOSYN) 3.375 g in 0.9% sodium chloride (MBP/ADV) 100 mL  3.375 g IntraVENous Q12H  VANCOMYCIN INFORMATION NOTE   Other Rx Dosing/Monitoring  glucose chewable tablet 16 g  4 Tab Oral PRN  
 dextrose (D50W) injection syrg 12.5-25 g  12.5-25 g IntraVENous PRN  
 glucagon (GLUCAGEN) injection 1 mg  1 mg IntraMUSCular PRN  
 fentaNYL (PF) 1,500 mcg/30 mL (50 mcg/mL) infusion  0-200 mcg/hr IntraVENous TITRATE  chlorhexidine (PERIDEX) 0.12 % mouthwash 15 mL  15 mL Oral Q12H  
 methylPREDNISolone (PF) (SOLU-MEDROL) injection 40 mg  40 mg IntraVENous Q12H  morphine injection 2 mg  2 mg IntraVENous Q30MIN PRN  
 NOREPINephrine (LEVOPHED) 8 mg in 5% dextrose 250mL infusion  2-30 mcg/min IntraVENous TITRATE Telemetry: [x]Sinus []A-flutter []Paced []A-fib []Multiple PVCs Labs: 
Recent Labs 08/01/19 
1205 WBC 19.3* HGB 8.2* HCT 29.2*  
 Recent Labs 08/01/19 
1205 *  
K 5.1 * CO2 21 * * CREA 4.71* CA 8.8 ALB 1.5* TBILI 0.4 SGOT 33 ALT 17 No results for input(s): PH, PCO2, PO2, HCO3, FIO2 in the last 72 hours. Imaging: 
[x]I have personally reviewed the patients radiographsmultifocal rt lung infiltrate, anterior mass, emphysema []Radiographs reviewed with radiologist 
 []No change from prior, tubes and lines in adequate position []Improved   []Worsening IMPRESSION:  
· Acute hypoxic resp failure, multifocal pneumonia · Sepsis, hypotension · ARF ? ckd · Emphysema · Blind · LUE edema · Anemia · Lung mass, hx of prostate cancer · Hypernatremia/dehydration PLAN:  
· Vent : ac vent peep 10 wean fio2, low vt 
· abg 
· Central line · Levophed · cvp monitor, ivf bolus · ssi for bs control · Iv abx started vanc, zosyn · If survives acute illness will need [facility] · Doppler lue r/o dvt · Sq heparin The patient is: [] acutely ill Risk of deterioration: [] moderate [x] critically ill  [x] high []See my orders for details My assessment/plan was discussed with: 
[x]nursing []PT/OT [x]respiratory therapy []  
[x]family [] [x]Total critical care time exclusive of procedures     60  minutes Constance Macario MD

## 2019-08-02 NOTE — PROGRESS NOTES
Critical Care Documentation Name: Yaniv Coats YOB: 1935 MRN: [id number] Admission Date: 8/1/2019 11:28 AM 
 
Date of service: 8/2/2019 Active Diagnoses: 
 
Hospital Problems  Date Reviewed: 8/1/2019 Codes Class Noted POA * (Principal) Aspiration pneumonia ([facility] ICD-10-CM: J69.0 ICD-9-CM: 507.0  8/1/2019 Unknown Chief Complaint: 
Patient unable to provide Clinical Presentation: 
83 y/o male with h/o COPD/ T2 DM, HTN admitted with dx sepsis, aspiration pneumonia, hypernatremia CODE BLUE called by nurse as patient went bradycardic into a witnessed PEA arrest. 
Pt was already ETT intubated during last CODE BLUE and is on vasopressor support on max doses of Levophed and Mt-synephrine IV. ACLS/ CPR was initated. Patient was ambug ventilated. Patient was reportedly given Epinephrine 1 mg and sodium bicarbonate 1 amp IV x 1 dose. Patient had ROSC. Physical Exam:  
Pt seen and evaluated. VS:  BP = 134/75  HR= 120  RR= 23   O2sat= 97% on Assist control ventilation FiO2 100% PE: 
GEN-ill appearing male on mechanical ventilator support PSYCH- unresponsive NEURO-GCS 3T. HEENT-NCAT/pupils 2 mm non-reactive on left. R sclera/ conjunctiva opacified. Oropharynx with ETT in place. NECK-supple, trachea midline LUNGS-CTA B 
HEART-RRR 
ABD-soft, NT,ND, hypoactive BS. No R/G/R 
VASCULAR-2+ palpable femoral pulse SKIN-warm/dry MS-no calf tenderness A/P: 
Acute cardiac PEA arrest.  Already ETT intubated. Continue mechanical ventilator and vasopressor support. Repeat cxr and ekg post-CPR. Check labs and abg. Continuous pulse/ telemetry monitoring. Notify family. CODE STATUS:  FULL CODE. CLINICAL STATUS: CRITICAL Data Reviewed: All diagnostic labs and studies have been reviewed. Assessment and Plan: 
 
Acute cardiac PEA arrest.  Already ETT intubated.   Continue mechanical ventilator and vasopressor support. Repeat cxr and ekg post-CPR. Check labs and abg. Continuous pulse/ telemetry monitoring. Notify family. CODE STATUS:  FULL CODE. CLINICAL STATUS: CRITICAL Medications Administered:  
Sedation: [ ] yes [ Marjo Kentrell no Anxiolytics: [ ] yes Audelia.Ivans ] no Antiarrhythmics: [ ] yes Audelia.Ivans ] no Antihypertensives: [ ] yes Audelia.Ivans ] no  
Pressors: [ Marjo Kentrell yes [ ] no IVF's: Audelia.Ivans ] yes [ ] no  
 
 
Critical Care Attestation: This patient is unstable and critically ill. Due to a high probability of clinically significant, life threatening deterioration, the patient required my highest level of preparedness to intervene emergently and I personally spent this critical care time directly and personally managing the patient. This critical care time included obtaining a history; examining the patient; pulse oximetry; ordering and review of studies; arranging urgent treatment with development of a management plan; evaluation of patient's response to treatment; frequent reassessment; and, discussions with other providers and/or family. This critical care time was performed to assess and manage the high probability of imminent, life-threatening deterioration that could result in multi-organ failure and death. It was exclusive of separately billable procedures, treating other patients, and teaching time. Time Spent:  
 
Total critical care time: Approximately 30 minutes Priya Geronimo MD 
8/2/2019 
12:23 AM

## 2019-08-02 NOTE — PROGRESS NOTES
1930: Bedside shift report received from [facility] NATALIIA. 2030Marycarmen Jenkins MD, pulmonology at bedside. Plan to intubate patient and start central line. Consents obtained over phone by daughter, Arlette Downs. 2100: Pt intubated. BP 55/35. Orders received for levohped. See mar for titration. 2126Marycarmen Jenkins MD at bedside. Preparing patient for central line - pt lali down to the 20s, no pulses palpable. PEA arrest. CODE BLUE initiated. CPR x2 min, 1 epinephrine and 2 bicarbs given. Pt sinus tach on pulse check. 2140: Central line placed. 2355: Xray at bedside for KUB- og placement verification. Pt bp 64/29. Levophed maxed. Mt gtt increased. 0008: Pt desating in the 76s. Bagging patient. BP 51/24. Maxed Mt gtt. 500 cc bolus started. 0013:  Pt bradycardic in the 30s. PEA. CODE BLUE initiated. CPR x2 minutes, epi and bicarb x1. 
0016: Pt regained pulse- sinus tach 117. Dylon LACY at bedside, order received. 0020: Bedside and Verbal shift change report given to Carla Dudley (oncoming nurse) by Gregory Massey RN (offgoing nurse). Report included the following information SBAR, Kardex, Procedure Summary, Intake/Output, MAR and Accordion.

## 2019-08-02 NOTE — PROGRESS NOTES
Reason for Admission:   Presented to the ED with lethargy, Lung sounds: wheezes. RA sat 85 % placed on 2 L  CXR: right sided pna. Admitted with sepsis. Patient has suffered 2 PEA arrests once admitted to ICU is now intubated. RRAT Score:  9 / green / low Plan for utilizing home health:    NA, plan is to return to [facility] Current Advanced Directive/Advance Care Plan: Not on file, evette Larsen [phone] in [facility] Transition of Care Plan:    
 
Care manager met with patient's daughter Hetal Larsen [phone] to introduce self and explain role. Per daughter patient had been living alone in a high rise apartment and had fallen. Per reports he had layed on the floor for maybe 2-3 days before being found. Patient was treated at [address] [place] [address] and was discharged to [facility] for rehab. Per daughter patient uses either a walker or cane for ambulation. He was receiving home health but she is unsure which agency. Confirmed insurance to be Medicare A,B with [facility] Medicaid P as a secondary. Palliative care has been consulted. Care management will follow for transitions of care. Morena Man RN,CRM Care Management Interventions Palliative Care Criteria Met (RRAT>21 & CHF Dx)?: No 
Transition of Care Consult (CM Consult): SNF(Was recieving rehab at [facility]) Radhahart Signup: No 
Discharge Durable Medical Equipment: No 
Physical Therapy Consult: No 
Occupational Therapy Consult: No 
Speech Therapy Consult: Yes Current Support Network: Lives Alone(Daughter Hetal Fail [phone])

## 2019-08-02 NOTE — WOUND CARE
Wound Consult:  New Patient Visit. Chart reviewed. Consulted for skin concerns POA. Spoke with patients nurse at bedside and we turned and provided care with PCT. Patient is resting on a SPORT bed. He is intubated; Prevalon boots in place. Assessment: 
Sacrum - small linear dark discoloration of intact skin ~ 1.2 x 0.6 cm, non-blanching - possible DTI POA - Mepilex Border in use - would continue. Right buttock - ~ 2 x 2 cm purple discoloration of intact skin, no surrounding redness, no induration - possible DTI - GARTH. Mid spine - ~ 3.5 x 1.5 cm of dark discoloration of intact skin, possible DTI, no surrounding redness or induration - Mepilex Border in use and would continue. No discoloration to heels, right second toe dry and slightly darker than others. On vasopressors, feet cool. He has dry abrasions on bilateral knees. Wound Recommendations: 
Continue protective dressings. Skin Care Recommendations: 1. Minimize friction/shear: minimize layers of linen/pads under patient. 2. Off load pressure/reposition: continue to turn and reposition approximately every 2 hours;  Prevalon boots. 3. Manage Moisture - keep skin folds dry; incontinence skin care, 
4. Continue to monitor nutrition, pain, and skin risk scale, and skin assessment. Plan: 
Spoke with Dr. Kaz Irvin regarding findings and obtained orders for treatment. We will continue to reassess routinely and as needed. Mariama Wing RN,[facility] Wound Healing Office [phone] Pager [phone]

## 2019-08-02 NOTE — ACP (ADVANCE CARE PLANNING)
Advance Care Planning Note Name: Glen Vasquez YOB: 1935 MRN: [id number] Admission Date: 8/1/2019 11:28 AM 
 
Date of discussion: 8/2/2019 Active Diagnoses: 
 
Hospital Problems  Date Reviewed: 8/1/2019 Codes Class Noted POA * (Principal) Aspiration pneumonia ([facility] ICD-10-CM: J69.0 ICD-9-CM: 507.0  8/1/2019 Unknown These active diagnoses are of sufficient risk that focused discussion on advance care planning is indicated in order to allow the patient to thoughtfully consider personal goals of care, and if situations arise that prevent the ability to personally give input, to ensure appropriate representation of their personal desires for different levels and aggressiveness of care. Discussion:  
 
Persons present and participating in discussion: Glen VasquezLorena MD, Pt's daughter Jason Nix Discussion:  
Reviewed current hospital course, cardiac arrest x 2 overnight, currently on a lot of support, high dose pressors (levophed, latosha and vaso), acute renal failure, acute resporatory failure on a ventilator. He does have a large lung mass invading into his mediastinum, which she was not aware of prior to this admission. Unclear if the patient was aware of it however. She told me he initially told providers he did not want to be intubated, but later requested to be intubated. Discussed that he is critically ill, and I am very concerned he will not survive the night. In order to survive he would need dialysis. We discussed code status, the meaning of full code and DNR. After prolonged discussion she would like to change to no CPR. Can continue current management at this time, and she is not yet ready for compassionate extubation. We did however, review those options with her. If his heart were to stop, or his pulse were to be lost, we would not do CPR, and let him pass away. For now she would like to continue pressors, and medical therapy. She did agree to dialysis. Time Spent:  
 
Total time spent face-to-face in education and discussion: 25 minutes.   
 
Alejandro Steven MD 
8/2/2019 
3:55 PM

## 2019-08-02 NOTE — PROGRESS NOTES
I called and notified patient's daughter/ next of kin Debby Genesis regarding patient's cardiac arrest and clinical status which is critical.

## 2019-08-02 NOTE — PROGRESS NOTES
Arterial Line Procedure Note Sterile prep/drape with Chlorhexidine. 0.5 mL 1% Lidocaine placed at insertion site. Right radial artery cannulated x 2 attempt(s) utilizing the Seldinger technique. Catheter sutured with 3-0 silk. Sterile dressing placed.

## 2019-08-02 NOTE — PROGRESS NOTES
Hospitalist Progress Note Loletha Kayser, MD 
Answering service: [phone] OR [phone] from in house phone Date of Service:  2019 NAME:  Sebastián Wise :  1935 MRN:  [id number] Admission Summary:  
The patient is an 79-year-old gentleman who has previous history significant for hypertension, diabetes, chronic kidney disease, and COPD. There is no family available at this time. I did call [facility] where the nurse tells me that he has been there for the last 3 weeks. The patient has previous history of aspiration pneumonia requiring admission at [facility] and has been on nectar-thickened liquids since then. He was found to be hypernatremic yesterday. His sodium was 168. He was given D5 water 1 liter at Nursing home. The patient, this morning, was found to be hypoxic. Interval history / Subjective:  
Lactate remains elevated at 10 Assessment & Plan: PEA arrest x 2 - likely secondary to sepsis as below. - Code discussion with family today. - Check Echo Severe Septic shock - likely with source aspiration PNA, with end organ damage (lactic acidosis, acute renal failure, acute respiratory failure) - HCAP coverage with Vanc, Zosyn, and azithro - Sputum culture - F/U blood cultures - On multiple pressors: levophed, vaso, and latosha 
- SDS 
- A line placement today - Trend lactate, persistently high - Trend procalcitonin Acute hypoxic respiratory failure - intubated. - intensivist following, appreciate recs - Sedation per intensivist 
- CXR daily - ABG daily 
- vent bundle Acute renal failure, hyperkalemia  - oliguria - Nephrology consulted - Will need CVVH if pursuing all measures. - Line placement by IR if pursuing CVVH 
- Shell placed, continue strict I and O  
- IV bicarb for now AG Metabolic acidosis - combination of sepsis, lactic acidosis, renal failure. Montior, on bicarb gtt. Persistent lactic acidosis - as high as 10, secondary to severe septic shock, continue to monitor, Large lung mass with extension into mediastinum - no known h/o lung cancer, but also usually gets his care at [address] [place] [address]. Consult palliative today Type 2 diabetes with hyperglycemia - persistently over 300 
- Start insulin gtt, as sugars are highly variable. - POCT glucose checks, hypoglycemia protocol Hypernatremia - secondary to poor PO intake, continue D5W, nephrology following HTN - holding all medications due to septic shock H/o COPD - nebs PRN 
H/o prostate cancer - unknown stage, or how advanced. Anemia - likely acute on chronic from chronic disease, transfuse for hemoglobin less than 7, continue to monitor Code status: no CPR. Continue pressors, and intubation for now. Re-evaluate daily with family. DVT prophylaxis: SCDs Care Plan discussed with: Patient/Family Disposition: D Hospital Problems  Date Reviewed: 8/2/2019 Codes Class Noted POA Cardiac arrest [facility]) ICD-10-CM: I46.9 ICD-9-CM: 427.5  8/2/2019 Unknown * (Principal) Aspiration pneumonia ([facility] [address] ICD-10-CM: J69.0 ICD-9-CM: 507.0  8/1/2019 Unknown Review of Systems:  
Review of systems not obtained due to patient factors. Vital Signs:  
 Last 24hrs VS reviewed since prior progress note. Most recent are: 
Visit Vitals /82 Pulse (!) 109 Temp 97 °F (36.1 °C) Resp 26 Ht 5' 10\" (1.778 m) Wt 58.8 kg (129 lb 10.1 oz) SpO2 93% BMI 18.60 kg/m² Intake/Output Summary (Last 24 hours) at 8/2/2019 8843 Last data filed at 8/2/2019 1500 Gross per 24 hour Intake 6053.84 ml Output 115 ml Net 5938.84 ml Physical Examination:  
 
 
     
Constitutional:  Intubated, sedated. ENT:  Oral mucous moist, oropharynx benign. + temporal wasting Resp:  Course bilaterally R> L, no increased work of breathing, ETT in place. CV:  Regular rhythm, + Tachycardic, no murmurs, gallops, rubs GI:  Soft, non distended, non tender. normoactive bowel sounds, no hepatosplenomegaly Musculoskeletal:  No edema, warm, 2+ pulses throughout Neurologic:  Intubated and sedated, not following commands for me. Skin:  Good turgor, no rashes or ulcers Data Review:  
Reviewed all imaging and laboratory data. CT scan personally reviewed. Labs:  
 
Recent Labs 08/02/19 
0519 08/01/19 
1205 WBC 17.5* 19.3* HGB 7.6* 8.2* HCT 27.1* 29.2*  
 260 Recent Labs 08/02/19 
5148 08/02/19 
0519 08/01/19 
1205 * 159* 163*  
K 5.3* 5.5* 5.1 * 126* 129* CO2 19* 19* 21 * 103* 103* CREA 5.15* 4.87* 4.71* * 327* 115* CA 6.9* 6.9* 8.8 MG  --  2.8*  --   
 
Recent Labs 08/01/19 
1205 SGOT 33 ALT 17  TBILI 0.4 TP 7.9 ALB 1.5*  
GLOB 6.4* Recent Labs 08/02/19 [address] INR 1.9* PTP 18.2* APTT 39.9* No results for input(s): FE, TIBC, PSAT, FERR in the last 72 hours. No results found for: FOL, RBCF No results for input(s): PH, PCO2, PO2 in the last 72 hours. Recent Labs 08/02/19 
7091 TROIQ 0.30* No results found for: CHOL, CHOLX, CHLST, CHOLV, HDL, LDL, LDLC, DLDLP, TGLX, TRIGL, TRIGP, CHHD, CHHDX Lab Results Component Value Date/Time Glucose (POC) 299 (H) 08/02/2019 11:26 AM  
 Glucose (POC) 357 (H) 08/02/2019 05:41 AM  
 Glucose (POC) 354 (H) 08/02/2019 02:06 AM  
 Glucose (POC) 78 08/02/2019 02:03 AM  
 Glucose (POC) 77 08/02/2019 01:49 AM  
 
Lab Results Component Value Date/Time  Color YELLOW/STRAW 08/01/2019 12:45 PM  
 Appearance TURBID (A) 08/01/2019 12:45 PM  
 Specific gravity 1.019 08/01/2019 12:45 PM  
 pH (UA) 6.0 08/01/2019 12:45 PM  
 Protein 100 (A) 08/01/2019 12:45 PM  
 Glucose NEGATIVE  08/01/2019 12:45 PM  
 Ketone NEGATIVE  08/01/2019 12:45 PM  
 Bilirubin NEGATIVE  08/01/2019 12:45 PM  
 Urobilinogen 1.0 08/01/2019 12:45 PM  
 Nitrites NEGATIVE  08/01/2019 12:45 PM  
 Leukocyte Esterase LARGE (A) 08/01/2019 12:45 PM  
 Epithelial cells MODERATE (A) 08/01/2019 12:45 PM  
 Bacteria NEGATIVE  08/01/2019 12:45 PM  
 WBC 0-4 08/01/2019 12:45 PM  
 RBC 0-5 08/01/2019 12:45 PM  
 
 
 
Medications Reviewed:  
 
Current Facility-Administered Medications Medication Dose Route Frequency  sodium bicarbonate (8.4%) 100 mEq in 0.45% sodium chloride 1,000 mL infusion   IntraVENous CONTINUOUS  
 vasopressin (VASOSTRICT) 20 Units in 0.9% sodium chloride 100 mL infusion  0.04 Units/min IntraVENous CONTINUOUS  
 hydrocortisone Sod Succ (PF) (SOLU-CORTEF) injection 50 mg  50 mg IntraVENous Q8H  
 [START ON 8/3/2019] Vancomycin random level - due 8/3 @ 0400. Thanks! Other ONCE  
 bicarbonate dialysis (PRISMASOL) BG K 2/Ca 3.5 5000 ml solution   Extracorporeal DIALYSIS CONTINUOUS  
 sodium chloride (NS) flush 5-10 mL  5-10 mL IntraVENous PRN  
 sodium chloride (NS) flush 5-40 mL  5-40 mL IntraVENous Q8H  
 sodium chloride (NS) flush 5-40 mL  5-40 mL IntraVENous PRN  
 [Held by provider] heparin (porcine) injection 5,000 Units  5,000 Units SubCUTAneous Q12H  
 dextrose 5% infusion  75 mL/hr IntraVENous CONTINUOUS  
 albuterol-ipratropium (DUO-NEB) 2.5 MG-0.5 MG/3 ML  3 mL Nebulization Q6H RT  
 piperacillin-tazobactam (ZOSYN) 3.375 g in 0.9% sodium chloride (MBP/ADV) 100 mL  3.375 g IntraVENous Q12H  VANCOMYCIN INFORMATION NOTE   Other Rx Dosing/Monitoring  glucose chewable tablet 16 g  4 Tab Oral PRN  
 dextrose (D50W) injection syrg 12.5-25 g  12.5-25 g IntraVENous PRN  
 glucagon (GLUCAGEN) injection 1 mg  1 mg IntraMUSCular PRN  
 fentaNYL (PF) 1,500 mcg/30 mL (50 mcg/mL) infusion  0-200 mcg/hr IntraVENous TITRATE  chlorhexidine (PERIDEX) 0.12 % mouthwash 15 mL  15 mL Oral Q12H  
 morphine injection 2 mg  2 mg IntraVENous Q30MIN PRN  
  NOREPINephrine (LEVOPHED) 8 mg in 5% dextrose 250mL infusion  2-100 mcg/min IntraVENous TITRATE  famotidine (PF) (PEPCID) 20 mg in sodium chloride 0.9% 10 mL injection  20 mg IntraVENous Q24H  
 insulin lispro (HUMALOG) injection   SubCUTAneous Q6H  
 PHENYLephrine (SHILO-SYNEPHRINE) 30 mg in 0.9% sodium chloride 250 mL infusion   mcg/min IntraVENous TITRATE  azithromycin (ZITHROMAX) 500 mg in 0.9% sodium chloride (MBP/ADV) 250 mL  500 mg IntraVENous Q24H  
 
______________________________________________________________________ EXPECTED LENGTH OF STAY: 4d 19h ACTUAL LENGTH OF STAY:          1 
 
            
Valentine Byrnes MD

## 2019-08-02 NOTE — PROGRESS NOTES
Responded to Code Blue in 2709. Consulted with nurse. Family arrived shortly after  arrived to the pt's room.  provided passionate support  as family waited for report from doctor.  remained with family for support  after the doctor spoke with them.  offered prayer  Advised of  Availability. Chaplains will follow as able and/or needed. Edgard Arreolar,  Intern, MDiv 
[phone]

## 2019-08-02 NOTE — PROCEDURES
Bag ventilated sat 100 No teeth on exam 
Sedated versed and edtomidate Video scope used, nl larynx 7.5 ett inserted over stylet Pos et co2 and bs bilat Sat 100 
 
cxr pending

## 2019-08-02 NOTE — PROGRESS NOTES
2345: Bedside shift change report given to Kalen Dahl RN (oncoming nurse) by Emanuel Jaramillo RN (offgoing nurse). Report included the following information SBAR, Kardex, ED Summary, Intake/Output, MAR, Recent Results, Med Rec Status, Cardiac Rhythm SR/ST and Alarm Parameters . 0010: Code blue. 1 round of CPR, received 1 dose of epi and 1 dose of bicarb. 0040: Paged pulmonary. 0215:Spoke with pulmonary, received orders for IV dopamine and vasopressin to maintain MAP > 65. Okay with bicarb drip ordered by hospitalist, Dr. Jolynn Li and increase in vent rate from 20 to 22. 7989: Bedside shift change report given to JOS Benoit (oncoming nurse) by Kalen Dahl RN (offgoing nurse). Report included the following information SBAR, Kardex, ED Summary, Intake/Output, MAR, Recent Results, Med Rec Status, Cardiac Rhythm ST and Alarm Parameters .

## 2019-08-02 NOTE — PROGRESS NOTES
1830: TRANSFER - IN REPORT: 
 
Verbal report received from Patsie Kayser Rn(name) on [employer]  being received from ED(unit) for routine progression of care Report consisted of patients Situation, Background, Assessment and  
Recommendations(SBAR). Information from the following report(s) SBAR, Kardex, ED Summary, Intake/Output, MAR, Recent Results and Cardiac Rhythm ST was reviewed with the receiving nurse. Opportunity for questions and clarification was provided. Assessment completed upon patients arrival to unit and care assumed. Primary Nurse Astrid Pickens and Camillo Hashimoto, RN performed a dual skin assessment on this patient No impairment noted- abrasions to L knee, R shin, L elbow, darkened area on mid back. Demarco score is 13 
 
 
1930: Bedside and Verbal shift change report given to [facility] (oncoming nurse) by Alonso Billings RN (offgoing nurse). Report included the following information SBAR, Kardex, Intake/Output, MAR, Recent Results and Cardiac Rhythm ST.

## 2019-08-02 NOTE — PROGRESS NOTES
SLP Contact Note Consult received and appreciated. Patient chart reviewed. Patient is currently intubated s/p code blue and therefore will defer SLP for now. Of note, discussed this patient with [facility] SLP department, where he has previously received care. [facility] states that he received a Modified Barium Swallow Study ~one year ago there and was on NDD2/nectar-thick liquids following that. Most recently was seen 7/2/2019 at [facility] and he was ultimately discharged from [facility] on NDD1 (pureed diet)/nectar-thick liquids. Thus, he has a baseline need for nectar-thick liquids and diet modifications. Will continue to follow for patient readiness to participate in SLP. Thank you, Donna Kenny M.Ed, CCC-SLP Speech-Language Pathologist

## 2019-08-02 NOTE — PROGRESS NOTES
Patient's daughter has made the decision not to pursue CRRT No chest compressions or shocks Discussed with Dr. Lynda Mosquera and Dr. Cynthia Wilhelm Likely compassionate extubation over the weekend Please call us back if anything changes Gracy Mack MD 
[address], [address] [facility] Phone - [phone] Fax - [phone] 
[email or web address]. [email or web address]

## 2019-08-02 NOTE — PROGRESS NOTES
PULMONARY/Critical Care/SLEEP MEDICINE  [facility] Name: Debby Hansen : 1935 MRN: [id number] Date: 2019 9:11 PM  
[]I have reviewed the flowsheet and previous days notes. [x]The patient is unable to give any meaningful history or review of systems because the patient is: 
[x]Intubated []disoriented []Sedated [x]Unresponsive []The patient is critically ill on     
[x]Mechanical ventilation [x]Pressors []BiPAP []  
 
 Seen and examined. Events noted. Now with multisystem organ failure and refractory shock- 3 pressors. Worsening metabolic acidosis, renal failure and hyperkalemia- may be too hemodynamically unstable for RRT. Oozing from CVL insertion site. Cultures pending. Hb 7.6. No coags available for review. CXR with right sided infiltrates and infiltrating lung mass. PEA arrest X 2-- remains a full code. No family at bedside  CTSP by hospitalist for resp failure,  dw pt daughter on phone, she could give no detail of his medical history, reports he goes to [facility], now at [facility], could tell me he had prostate cancer many years ago, that's all, not aware of current medical issues, she did spell the word subcutaneous for some reason. He was sent in with resp distress, pneumonia. I found him in icu on bipap with rr over 35 and mumbling, easily agitated, pulling at fm. I proceeded with intubation. BP now low ROS:Review of systems not obtained due to patient factors. Vital Signs:   
Visit Vitals /77 Pulse (!) 107 Temp 97.1 °F (36.2 °C) Resp 25 Ht 5' 10\" (1.778 m) Wt 58.8 kg (129 lb 10.1 oz) SpO2 93% BMI 18.60 kg/m² O2 Device: Ventilator Temp (24hrs), Av.6 °F (36.4 °C), Min:96.3 °F (35.7 °C), Max:98.9 °F (37.2 °C) Intake/Output:  
Last shift:      701 -  1900 In: 1867.3 [I.V.:1867.3] Out: 16 [Urine:16] Last 3 shifts: 1901 -  0700 In: 3372.3 [I.V.:3372.3] Out: 80 [Urine:80] Intake/Output Summary (Last 24 hours) at 8/2/2019 1219 Last data filed at 8/2/2019 1100 Gross per 24 hour Intake 5239.61 ml Output 96 ml Net 5143.61 ml Physical Exam: 
 
General: [x]Intubated/sedated []No acute distress [] []Ill appearing [] [] HEENT: []Icteric []PERRL [x]opacified cornea  
 []Anicteric Mucosa:[]moist   [x]dry [x] edentulous Resp: []Wheeze []Clear to ascultation bilaterally [x]Accessory muscle use [x]Rales []Chest tube:  []Air leak [] []Ronchi []Crepitus [] []Coarse bilateral ventilator breath sounds CV: [x]Regular []Tachycardia [] []Irregular []Bradycardic []  
 []Murmur []S3 [] []Edema []S4 []  
  
GI: [x]Soft  []NG Tube Bowel sounds: [x]present []absent []Firm []PEG [] []Distended  []  
  
: []Shell []Hematuria [] []Clear urine []Edema [] MSK:  
 []SCDs [x]Edema lue [x]muscle wasting []No deformities [] []  
  
Skin: [x]Warm [x]Dry  [] []Cool []Moist [] []Hot  []Diaphoretic [] []Rash  []Cyanosis [] N-psych: [x]Sedated  []Agitated [] []Alert  Follows commands: 
 []Yes  [x]No [] Devices: [x]ET-Tube []Tracheostomy []Chest tube: Air leak? []Y/[]N  
 [x]Central Line []PA Catheter []  
 []PICC [] [] DATA:  
Current Facility-Administered Medications Medication Dose Route Frequency  sodium bicarbonate (8.4%) 100 mEq in 0.45% sodium chloride 1,000 mL infusion   IntraVENous CONTINUOUS  
 vasopressin (VASOSTRICT) 20 Units in 0.9% sodium chloride 100 mL infusion  0.04 Units/min IntraVENous CONTINUOUS  
 hydrocortisone Sod Succ (PF) (SOLU-CORTEF) injection 50 mg  50 mg IntraVENous Q8H  
 sodium chloride (NS) flush 5-10 mL  5-10 mL IntraVENous PRN  
 sodium chloride (NS) flush 5-40 mL  5-40 mL IntraVENous Q8H  
 sodium chloride (NS) flush 5-40 mL  5-40 mL IntraVENous PRN  
 [Held by provider] heparin (porcine) injection 5,000 Units  5,000 Units SubCUTAneous Q12H  dextrose 5% infusion  75 mL/hr IntraVENous CONTINUOUS  
 albuterol-ipratropium (DUO-NEB) 2.5 MG-0.5 MG/3 ML  3 mL Nebulization Q6H RT  
 piperacillin-tazobactam (ZOSYN) 3.375 g in 0.9% sodium chloride (MBP/ADV) 100 mL  3.375 g IntraVENous Q12H  VANCOMYCIN INFORMATION NOTE   Other Rx Dosing/Monitoring  glucose chewable tablet 16 g  4 Tab Oral PRN  
 dextrose (D50W) injection syrg 12.5-25 g  12.5-25 g IntraVENous PRN  
 glucagon (GLUCAGEN) injection 1 mg  1 mg IntraMUSCular PRN  
 fentaNYL (PF) 1,500 mcg/30 mL (50 mcg/mL) infusion  0-200 mcg/hr IntraVENous TITRATE  chlorhexidine (PERIDEX) 0.12 % mouthwash 15 mL  15 mL Oral Q12H  
 morphine injection 2 mg  2 mg IntraVENous Q30MIN PRN  
 NOREPINephrine (LEVOPHED) 8 mg in 5% dextrose 250mL infusion  2-100 mcg/min IntraVENous TITRATE  famotidine (PF) (PEPCID) 20 mg in sodium chloride 0.9% 10 mL injection  20 mg IntraVENous Q24H  
 insulin lispro (HUMALOG) injection   SubCUTAneous Q6H  
 PHENYLephrine (SHILO-SYNEPHRINE) 30 mg in 0.9% sodium chloride 250 mL infusion   mcg/min IntraVENous TITRATE  azithromycin (ZITHROMAX) 500 mg in 0.9% sodium chloride (MBP/ADV) 250 mL  500 mg IntraVENous Q24H Telemetry: [x]Sinus []A-flutter []Paced []A-fib []Multiple PVCs Labs: 
Recent Labs 08/02/19 
0519 08/01/19 
1205 WBC 17.5* 19.3* HGB 7.6* 8.2* HCT 27.1* 29.2*  
 260 Recent Labs 08/02/19 
1281 08/02/19 
0519 08/01/19 
1205 * 159* 163*  
K 5.3* 5.5* 5.1 * 126* 129* CO2 19* 19* 21  
* 327* 115* * 103* 103* CREA 5.15* 4.87* 4.71* CA 6.9* 6.9* 8.8 MG  --  2.8*  --   
ALB  --   --  1.5* TBILI  --   --  0.4 SGOT  --   --  33 ALT  --   --  17 No results for input(s): PH, PCO2, PO2, HCO3, FIO2 in the last 72 hours. Imaging: 
[x]I have personally reviewed the patients radiographsmultifocal rt lung infiltrate, anterior mass, emphysema []Radiographs reviewed with radiologist 
 []No change from prior, tubes and lines in adequate position []Improved   []Worsening IMPRESSION:  
· Multisystem organ failure · Acute hypoxic resp failure, multifocal pneumonia · Sepsis, hypotension · ARF ? ckd · Emphysema · Blind · LUE edema · Anemia · Lung mass, hx of prostate cancer · Hypernatremia/dehydration · Bleeding from catheter site · Unknown LV fx PLAN:  
· Vent : ac vent peep 10 wean fio2, low vt 
· Pressors (NE, Vaso, PE): adjust and wean as tolerated · ssi for bs control · On bicarb drip · On D5 · Sedation reviewed · On stress dose steroids · On pepcid · Vent bundle · Echo · Iv abx started vanc, azithro, zosyn · If survives acute illness will need [facility] [place] [facility] · Doppler lue r/o dvt · Sq heparin- held until review of coags · SCDs · Glycemic control reviewed · Critically ill, at high risk for decline and death. Also has an infiltrating mass in the lung, mediastinum and sternum-- with performance status poor before onset of current illness, he is not likely to be a candidate for any treatment. Willl consult Palliative Medicine for discuss goals of care The patient is: [] acutely ill Risk of deterioration: [] moderate [x] critically ill  [x] high []See my orders for details My assessment/plan was discussed with: 
[x]nursing []PT/OT [x]respiratory therapy []  
[x]family [] [x]Total critical care time exclusive of procedures     80  minutes Gordon Sullivan MD

## 2019-08-02 NOTE — PROGRESS NOTES
Nephrology Progress Note [place] Date of Admission : 8/1/2019 CC:  Follow up for SARAH Assessment and Plan SARAH: 
- 2/2 severe volume depletion + ATN post cardiac arrest 
- anuric, w/ rising Cr and hyperK - will need CRRT if aggressive measures are desired 
- will discuss with family when they arrive 
  
Hypernatremia: 
- form lack of free water intake 
- cont D5W for now 
  
PE arrest X 2: 
- now anuric, on max dose latosha Hypoxia: 
- ? Aspiration PNA 
- hx of recent PNA at [facility] 
- on broad spectrum abx 
  
Hypotension: 
- 2/2 sepsis + PEA arrest 
- on latosha now 
  
DM2: 
- per hospitalist  
 
 
Interval History: 
Seen and examined. PEA arrest x 2. Intubated, on pressors now. Off sedation and responsive. No UOP. Current Medications: all current  Medications have been eviewed in [facility] Review of Systems: Pertinent items are noted in HPI. Objective: 
Vitals:   
Vitals:  
 08/02/19 0700 08/02/19 0800 08/02/19 0811 08/02/19 9925 BP: 136/65 Pulse: (!) 103 Resp: 22 Temp:  97.1 °F (36.2 °C) SpO2:   100% 92% Weight:      
Height:      
 
Intake and Output: 
08/02 0701 - 08/02 1900 In: -  
Out: 6 [Urine:6] 
07/31 1901 - 08/02 0700 In: 3372.3 [I.V.:3372.3] Out: 80 [Urine:80] Physical Examination: 
Pt intubated    Yes General: On the vent, awake and opening eyes, does not follow commands Neck:  Supple, no mass Resp:  Decreased bibasilar breath sounds CV:  RRR,  no murmur or rub, no LE edema GI:  Soft, NT, + Bowel sounds, no hepatosplenomegaly Neurologic:  Opens eyes on the vent Psych:             Unable to assess Skin:  No Rash :  Shell in place 
 
[]    High complexity decision making was performed 
[]    Patient is at high-risk of decompensation with multiple organ involvement Lab Data Personally Reviewed: I have reviewed all the pertinent labs, microbiology data and radiology studies during assessment. Recent Labs 08/02/19 
0519 08/01/19 
1205 * 163*  
K 5.5* 5.1 * 129* CO2 19* 21  
* 115* * 103* CREA 4.87* 4.71* CA 6.9* 8.8 MG 2.8*  --   
ALB  --  1.5* SGOT  --  33 ALT  --  17 Recent Labs 08/02/19 
0519 08/01/19 
1205 WBC 17.5* 19.3* HGB 7.6* 8.2* HCT 27.1* 29.2*  
 260 No results found for: SDES Lab Results Component Value Date/Time Culture result: NO GROWTH AFTER 19 HOURS 08/01/2019 12:20 PM  
 
Recent Results (from the past 24 hour(s)) EKG, 12 LEAD, INITIAL Collection Time: 08/01/19 11:58 AM  
Result Value Ref Range Ventricular Rate 116 BPM  
 Atrial Rate 116 BPM  
 P-R Interval 162 ms QRS Duration 64 ms Q-T Interval 342 ms QTC Calculation (Bezet) 475 ms Calculated P Axis 78 degrees Calculated R Axis 82 degrees Calculated T Axis 66 degrees Diagnosis Sinus tachycardia Biatrial enlargement No previous ECGs available Confirmed by Johnny Dial MD, Hamida Travis ([id number]) on 8/1/2019 12:44:24 PM 
  
CBC WITH AUTOMATED DIFF Collection Time: 08/01/19 12:05 PM  
Result Value Ref Range WBC 19.3 (H) 4.1 - 11.1 K/uL  
 RBC 2.79 (L) 4.10 - 5.70 M/uL HGB 8.2 (L) 12.1 - 17.0 g/dL HCT 29.2 (L) 36.6 - 50.3 % .7 (H) 80.0 - 99.0 FL  
 MCH 29.4 26.0 - 34.0 PG  
 MCHC 28.1 (L) 30.0 - 36.5 g/dL  
 RDW 18.8 (H) 11.5 - 14.5 % PLATELET 828 708 - 656 K/uL MPV 11.6 8.9 - 12.9 FL  
 NRBC 0.9 (H) 0  WBC ABSOLUTE NRBC 0.18 (H) 0.00 - 0.01 K/uL NEUTROPHILS 83 (H) 32 - 75 % BAND NEUTROPHILS 1 0 - 6 % LYMPHOCYTES 12 12 - 49 % MONOCYTES 4 (L) 5 - 13 % EOSINOPHILS 0 0 - 7 % BASOPHILS 0 0 - 1 % IMMATURE GRANULOCYTES 0 %  
 ABS. NEUTROPHILS 16.2 (H) 1.8 - 8.0 K/UL  
 ABS. LYMPHOCYTES 2.3 0.8 - 3.5 K/UL  
 ABS. MONOCYTES 0.8 0.0 - 1.0 K/UL  
 ABS. EOSINOPHILS 0.0 0.0 - 0.4 K/UL  
 ABS. BASOPHILS 0.0 0.0 - 0.1 K/UL  
 ABS. IMM. GRANS. 0.0 K/UL  
 DF SMEAR SCANNED    
 RBC COMMENTS ANISOCYTOSIS 1+ 
    
 RBC COMMENTS MACROCYTOSIS 
1+ 
 RBC COMMENTS NOTIFIED NYA LEACH, OF CORRECTED REPORT @ 1303/PHB METABOLIC PANEL, COMPREHENSIVE Collection Time: 08/01/19 12:05 PM  
Result Value Ref Range Sodium 163 (HH) 136 - 145 mmol/L Potassium 5.1 3.5 - 5.1 mmol/L Chloride 129 (H) 97 - 108 mmol/L  
 CO2 21 21 - 32 mmol/L Anion gap 13 5 - 15 mmol/L Glucose 115 (H) 65 - 100 mg/dL  (H) 6 - 20 MG/DL Creatinine 4.71 (H) 0.70 - 1.30 MG/DL  
 BUN/Creatinine ratio 22 (H) 12 - 20 GFR est AA 15 (L) >60 ml/min/1.73m2 GFR est non-AA 12 (L) >60 ml/min/1.73m2 Calcium 8.8 8.5 - 10.1 MG/DL Bilirubin, total 0.4 0.2 - 1.0 MG/DL  
 ALT (SGPT) 17 12 - 78 U/L  
 AST (SGOT) 33 15 - 37 U/L Alk. phosphatase 107 45 - 117 U/L Protein, total 7.9 6.4 - 8.2 g/dL Albumin 1.5 (L) 3.5 - 5.0 g/dL Globulin 6.4 (H) 2.0 - 4.0 g/dL A-G Ratio 0.2 (L) 1.1 - 2.2 SAMPLES BEING HELD Collection Time: 08/01/19 12:05 PM  
Result Value Ref Range SAMPLES BEING HELD 1BLU 1RED,1UC   
 COMMENT Add-on orders for these samples will be processed based on acceptable specimen integrity and analyte stability, which may vary by analyte. CULTURE, BLOOD, PAIRED Collection Time: 08/01/19 12:20 PM  
Result Value Ref Range Special Requests: NO SPECIAL REQUESTS Culture result: NO GROWTH AFTER 19 HOURS    
POC LACTIC ACID Collection Time: 08/01/19 12:27 PM  
Result Value Ref Range Lactic Acid (POC) 7.08 (HH) 0.40 - 2.00 mmol/L  
URINALYSIS W/ RFLX MICROSCOPIC Collection Time: 08/01/19 12:45 PM  
Result Value Ref Range Color YELLOW/STRAW Appearance TURBID (A) CLEAR Specific gravity 1.019 1.003 - 1.030    
 pH (UA) 6.0 5.0 - 8.0 Protein 100 (A) NEG mg/dL Glucose NEGATIVE  NEG mg/dL Ketone NEGATIVE  NEG mg/dL Bilirubin NEGATIVE  NEG Blood MODERATE (A) NEG Urobilinogen 1.0 0.2 - 1.0 EU/dL Nitrites NEGATIVE  NEG  Leukocyte Esterase LARGE (A) NEG    
 WBC 0-4 0 - 4 /hpf  
 RBC 0-5 0 - 5 /hpf Epithelial cells MODERATE (A) FEW /lpf Bacteria NEGATIVE  NEG /hpf POC G3 - PUL Collection Time: 08/01/19  1:48 PM  
Result Value Ref Range pH (POC) 7.358 7.35 - 7.45    
 pCO2 (POC) 30.8 (L) 35.0 - 45.0 MMHG  
 pO2 (POC) 58 (L) 80 - 100 MMHG  
 HCO3 (POC) 17.3 (L) 22 - 26 MMOL/L  
 sO2 (POC) 89 (L) 92 - 97 % Base deficit (POC) 8 mmol/L Site LEFT BRACHIAL Device: NASAL CANNULA Flow rate (POC) 6 L/M Allens test (POC) N/A Specimen type (POC) ARTERIAL Total resp. rate 30 POC LACTIC ACID Collection Time: 08/01/19  3:12 PM  
Result Value Ref Range Lactic Acid (POC) 6.84 (HH) 0.40 - 2.00 mmol/L  
GLUCOSE, POC Collection Time: 08/01/19  7:02 PM  
Result Value Ref Range Glucose (POC) 80 65 - 100 mg/dL Performed by Rasheed Short POC G3 - PUL Collection Time: 08/01/19  7:03 PM  
Result Value Ref Range FIO2 (POC) 100 % pH (POC) 7.409 7.35 - 7.45    
 pCO2 (POC) 22.0 (L) 35.0 - 45.0 MMHG  
 pO2 (POC) 74 (L) 80 - 100 MMHG  
 HCO3 (POC) 13.9 (L) 22 - 26 MMOL/L  
 sO2 (POC) 95 92 - 97 % Base deficit (POC) 11 mmol/L Site LEFT RADIAL Device: BIPAP    
 PEEP/CPAP (POC) 8 cmH2O  
 PIP (POC) 15 Pressure support 5 cmH2O Allens test (POC) YES Specimen type (POC) ARTERIAL Total resp. rate 41 GLUCOSE, POC Collection Time: 08/01/19  7:15 PM  
Result Value Ref Range Glucose (POC) 79 65 - 100 mg/dL Performed by Rickie Souza, POC Collection Time: 08/01/19  7:58 PM  
Result Value Ref Range Glucose (POC) 141 (H) 65 - 100 mg/dL Performed by Manasa Ryder POC G3 - PUL Collection Time: 08/01/19 10:11 PM  
Result Value Ref Range FIO2 (POC) 100 % pH (POC) 7.269 (L) 7.35 - 7.45    
 pCO2 (POC) 42.1 35.0 - 45.0 MMHG  
 pO2 (POC) 149 (H) 80 - 100 MMHG  
 HCO3 (POC) 19.3 (L) 22 - 26 MMOL/L  
 sO2 (POC) 99 (H) 92 - 97 % Base deficit (POC) 8 mmol/L  Site LEFT RADIAL    
 Device: VENT Mode ASSIST CONTROL Tidal volume 400 ml Set Rate 20 bpm  
 PEEP/CPAP (POC) 10 cmH2O  
 PIP (POC) 15 Allens test (POC) N/A Specimen type (POC) ARTERIAL Total resp. rate 28 POC G3 - PUL Collection Time: 08/02/19 12:27 AM  
Result Value Ref Range FIO2 (POC) 100 % pH (POC) 7.167 (LL) 7.35 - 7.45    
 pCO2 (POC) 49.2 (H) 35.0 - 45.0 MMHG  
 pO2 (POC) 102 (H) 80 - 100 MMHG  
 HCO3 (POC) 17.8 (L) 22 - 26 MMOL/L  
 sO2 (POC) 96 92 - 97 % Base deficit (POC) 11 mmol/L Site LEFT RADIAL Device: VENT Mode ASSIST CONTROL Tidal volume 400 ml Set Rate 20 bpm  
 PEEP/CPAP (POC) 10 cmH2O  
 PIP (POC) 27 Allens test (POC) YES Specimen type (POC) ARTERIAL Total resp. rate 20 GLUCOSE, POC Collection Time: 08/02/19  1:07 AM  
Result Value Ref Range Glucose (POC) 51 (L) 65 - 100 mg/dL Performed by Ida Border GLUCOSE, POC Collection Time: 08/02/19  1:49 AM  
Result Value Ref Range Glucose (POC) 77 65 - 100 mg/dL Performed by Ida Border GLUCOSE, POC Collection Time: 08/02/19  2:03 AM  
Result Value Ref Range Glucose (POC) 78 65 - 100 mg/dL Performed by Ida Border GLUCOSE, POC Collection Time: 08/02/19  2:06 AM  
Result Value Ref Range Glucose (POC) 354 (H) 65 - 100 mg/dL Performed by Ida Border METABOLIC PANEL, BASIC Collection Time: 08/02/19  5:19 AM  
Result Value Ref Range Sodium 159 (H) 136 - 145 mmol/L Potassium 5.5 (H) 3.5 - 5.1 mmol/L Chloride 126 (H) 97 - 108 mmol/L  
 CO2 19 (L) 21 - 32 mmol/L Anion gap 14 5 - 15 mmol/L Glucose 327 (H) 65 - 100 mg/dL  (H) 6 - 20 MG/DL Creatinine 4.87 (H) 0.70 - 1.30 MG/DL  
 BUN/Creatinine ratio 21 (H) 12 - 20 GFR est AA 14 (L) >60 ml/min/1.73m2 GFR est non-AA 12 (L) >60 ml/min/1.73m2 Calcium 6.9 (L) 8.5 - 10.1 MG/DL  
CBC W/O DIFF Collection Time: 08/02/19  5:19 AM  
Result Value Ref Range WBC 17.5 (H) 4.1 - 11.1 K/uL  
 RBC 2.63 (L) 4.10 - 5.70 M/uL HGB 7.6 (L) 12.1 - 17.0 g/dL HCT 27.1 (L) 36.6 - 50.3 % .0 (H) 80.0 - 99.0 FL  
 MCH 28.9 26.0 - 34.0 PG  
 MCHC 28.0 (L) 30.0 - 36.5 g/dL  
 RDW 19.0 (H) 11.5 - 14.5 % PLATELET 666 375 - 290 K/uL MPV 11.3 8.9 - 12.9 FL  
 NRBC 3.2 (H) 0  WBC ABSOLUTE NRBC 0.56 (H) 0.00 - 0.01 K/uL LACTIC ACID Collection Time: 08/02/19  5:19 AM  
Result Value Ref Range Lactic acid 10.2 (HH) 0.4 - 2.0 MMOL/L  
MAGNESIUM Collection Time: 08/02/19  5:19 AM  
Result Value Ref Range Magnesium 2.8 (H) 1.6 - 2.4 mg/dL TROPONIN I Collection Time: 08/02/19  5:19 AM  
Result Value Ref Range Troponin-I, Qt. 0.30 (H) <0.05 ng/mL GLUCOSE, POC Collection Time: 08/02/19  5:41 AM  
Result Value Ref Range Glucose (POC) 357 (H) 65 - 100 mg/dL Performed by Reji Dial MD 
[address] [place] [address], [address] [facility] Phone - [phone] Fax - [phone] 
www. [email or web address]

## 2019-08-02 NOTE — OP NOTES
Name: [facility]: [address]  
: 1935 Admit Date: 2019 Phone: [phone]  Room: [facility] [id number] PCP: Unknown, Provider  MRN: [id number] Date: 2019  Code: Full Code Procedure:  Central Line Using ultrasound guidance, Right internal jugular Airway: I (soft palate, uvula, fauces, tonsillar pillars visible) Indication:  Need for vasopressors Anesthesia:  0.1 % Lidocaine Time Out: emgent procedure, consent was obtained, no time out Summary:    
  
Full sterile barrier precautions were used. A 7 Step Sterility Protocol followed. (cap, mask sterile gown, sterile gloves, large sterile sheet, hand hygiene, 2% chlorhexidine for cutaneous antisepsis). US neck performed, left IJ thrombosied, rt ij patent, image stored. Vessel cannulated with us guidance  After x 1 attempts and line placed with no difficulty. Blood return noted. Catheter secured & Biopatch applied. Sterile Tegaderm placed. CXR pending Signed: Merced Tejeda MD

## 2019-08-02 NOTE — PROGRESS NOTES
Patient seen and evaluated for CODE BLUE. Patient reportedly was recently ETT intubated for acute respiratory failure. Patient reportedly went into PEA cardiac arrest. 
ACLS/ CPR was initated. Pulmonary Dr. Bandar Browning is already at the bedside and is code blue leader tonight. Patient is still undergoing cardiopulmonary resuscitation.  
Clinical status is critical.

## 2019-08-02 NOTE — DIABETES MGMT
[facility] Progress Note Recommendations/ Comments: Chart review for widely variable BG's. Initial BG's 80 mg/dL - 140 mg/dL Then became hypoglycemic - BG 51 mg.dL at 0107,  then spiked to 357 mg/dL this AM 
Noted events last night  -  intubation, Code Blue Elevated creatinine 5.15 If appropriate, please consider Would like more time to observe more BG data If BG's remian > 180 mg/dL persistently, may need basal insulin - start with 6 units daily (1 units 58.8 kg) May benefit form change to lispro high sensitivity correction scale due to renal status and higher risk of hypoglycemia Current hospital DM medication:  
Lispro normals sensitivity correction scale Chart reviewed on [employer]. Patient is a 82 y.o. male with known DM on Humulin 70/30, 18 units with breakfast and 10 units after lunch and Humalog sliding scale PTA Pt resides at [facility] A1c:  
No results found for: HBA1C, HGBE8, WHC2VMOC Recent Glucose Results:  
Lab Results Component Value Date/Time  (H) 08/02/2019 09:38 AM  
  (H) 08/02/2019 05:19 AM  
  (H) 08/01/2019 12:05 PM  
 GLUCPOC 357 (H) 08/02/2019 05:41 AM  
 GLUCPOC 354 (H) 08/02/2019 02:06 AM  
 GLUCPOC 78 08/02/2019 02:03 AM  
  
 
Lab Results Component Value Date/Time Creatinine 5.15 (H) 08/02/2019 09:38 AM  
 
Estimated Creatinine Clearance: 9.4 mL/min (A) (based on SCr of 5.15 mg/dL (H)). Active Orders Diet DIET NPO  
  
 
PO intake: No data found. Will continue to follow as needed. Thank you Kaylan Chaney RN, CDE Time spent: 10 min

## 2019-08-02 NOTE — CONSULTS
Palliative Medicine Consult [place]: [phone] Patient Name: Glen Vasquez YOB: 1935 Date of Initial Consult: 8/2/19 Reason for Consult: Care decisions Requesting Provider: Trina Escalante Primary Care Physician: Unknown, Provider SUMMARY:  
Glen Vasquez is a 82 y.o. with a past history of COPD, DM, CKD, aspiration PNA recently at [facility]  who was admitted on 8/1/2019 from [facility] w/ SOB and hypernatremia. Required intubation for resp distress. Late 8/1/19 pt w/ witnessed PEA arrest x2  already on vasopressors maxed out and intubated during arrest. Pt w/ worsening renal function and also found to have 7.8x7. 7x3.4cm mass in RUL invadding mediastiunum and sternum. Now w/ SARAH. Current medical issues leading to Palliative Medicine involvement include: care decisions. Social: Pt not . One child, Darby Fulton is NOK. PALLIATIVE DIAGNOSES:  
1. Multisystem organ failure 2. Large RUL malignancy 3. Debility 4. SOB 5. Goals of care PLAN:  
1. Follow up after Dr Argenis Bell and Shoaib talked to family. 2. Confirm that dtr Jason Nix is [facility]- her mother is not  to pt, pt is not . She is only child. Pt does not have an AMD. 3. Sounds as though pt had been living alone up until recent admission to [facility] a few weeks ago. 4. Decision changed from doing dialysis to not doing it. Confirmed this. Asked what made her change her mind, assure her that we all support this decision medically. Alondra's son (29 yo) sounds as though he talked w/ her after all conversations and he told her that it would \"prolong the inevitable\" and she does not want him to suffer. 5. Discuss that I agree with this decision, given his large RUL malignancy and other health issues. Thinking about quality of life at this point is important.   
6. Pt high risk of decline w/out dialysis and I talk a bit about a complete shift to comfort incl a compassionate extubation. She will be back in the hospital later this evening and again tmrw ~ 10am- she would prefer to talk in person and w/ her family present. 7. Pt not to have CPR or shock, cont vasopressors and medical tx, cont intubation (but not reintubation) and to f/u regarding shift to comfort measures. 8. Initial consult note routed to primary continuity provider and/or primary health care team members 9. Communicated plan of care with: Palliative IDT, [facility] [address] Team incl Leela Montano and Leanne Coats and Kaycee ARGUELLO 
 
 GOALS OF CARE / TREATMENT PREFERENCES:  
 
GOALS OF CARE: 
Patient/Health Care Proxy Stated Goals: (Eventual shift to comfort) TREATMENT PREFERENCES:  
Code Status: Partial Code- no CPR/shock Advance Care Planning: 
[x] The [facility] Interdisciplinary Team has updated the ACP Navigator with [place] and Patient Capacity Primary Decision Maker: Danielle [facility] - [phone] Other: As far as possible, the palliative care team has discussed with patient / health care proxy about goals of care / treatment preferences for patient. HISTORY:  
 
History obtained from: family, chart, staff CHIEF COMPLAINT: Cannot obtain due to patient factors HPI/SUBJECTIVE: The patient is:  
[] Verbal and participatory [x] Non-participatory due to: medical condition Pt intubated, on mult pressors, not interacting. Clinical Pain Assessment (nonverbal scale for severity on nonverbal patients):  
Clinical Pain Assessment Severity: 0 Activity (Movement): Lying quietly, normal position Duration: for how long has pt been experiencing pain (e.g., 2 days, 1 month, years) Frequency: how often pain is an issue (e.g., several times per day, once every few days, constant) FUNCTIONAL ASSESSMENT:  
 
Palliative Performance Scale (PPS): PPS: 20 
 
 
 PSYCHOSOCIAL/SPIRITUAL SCREENING:  
 
 Palliative IDT has assessed this patient for cultural preferences / practices and a referral made as appropriate to needs (Cultural Services, Patient Advocacy, Ethics, etc.) Any spiritual / Worship concerns: 
[] Yes /  [x] No 
 
Caregiver Burnout: 
[] Yes /  [x] No /  [] No Caregiver Present Anticipatory grief assessment:  
[x] Normal  / [] Maladaptive ESAS Anxiety: ESAS Depression:    
 
/Cannot obtain due to patient factors REVIEW OF SYSTEMS:  
 
Positive and pertinent negative findings in ROS are noted above in HPI. The following systems were [] reviewed / [x] unable to be reviewed as noted in HPI Other findings are noted below. Systems: constitutional, ears/nose/mouth/throat, respiratory, gastrointestinal, genitourinary, musculoskeletal, integumentary, neurologic, psychiatric, endocrine. Positive findings noted below. Modified ESAS Completed by: provider Fatigue: 10 Drowsiness: 10 Pain: 0 Dyspnea: 0 PHYSICAL EXAM:  
 
From RN flowsheet: 
Wt Readings from Last 3 Encounters:  
08/02/19 129 lb 10.1 oz (58.8 kg) 05/11/15 167 lb (75.8 kg) 04/24/15 170 lb (77.1 kg) Blood pressure 144/81, pulse (!) 107, temperature 97 °F (36.1 °C), resp. rate 19, height 5' 10\" (1.778 m), weight 129 lb 10.1 oz (58.8 kg), SpO2 96 %. Pain Scale 1: Adult Nonverbal Pain Scale Constitutional: on vent, not interacting Eyes: pupils equal, anicteric ENMT: no nasal discharge, moist mucous membranes, ET tube Respiratory: breathing not labored, symmetric Musculoskeletal: no deformity, no tenderness to palpation Skin: warm, dry Neurologic:not arousable HISTORY:  
 
Principal Problem: 
  Aspiration pneumonia ([facility] [address] (8/1/2019) Active Problems: 
  Cardiac arrest ([address] (8/2/2019) Past Medical History:  
Diagnosis Date  COPD  Diabetes ([address]  Hypertension  Other ill-defined conditions(799.89)   
 glaucoma History reviewed. No pertinent surgical history. History reviewed. No pertinent family history. History reviewed, no pertinent family history. Social History Tobacco Use  Smoking status: Never Smoker Substance Use Topics  Alcohol use: Yes No Known Allergies Current Facility-Administered Medications Medication Dose Route Frequency  sodium bicarbonate (8.4%) 100 mEq in 0.45% sodium chloride 1,000 mL infusion   IntraVENous CONTINUOUS  
 vasopressin (VASOSTRICT) 20 Units in 0.9% sodium chloride 100 mL infusion  0.04 Units/min IntraVENous CONTINUOUS  
 hydrocortisone Sod Succ (PF) (SOLU-CORTEF) injection 50 mg  50 mg IntraVENous Q8H  
 [START ON 8/3/2019] Vancomycin random level - due 8/3 @ 0400. Thanks! Other ONCE  
 bicarbonate dialysis (PRISMASOL) BG K 2/Ca 3.5 5000 ml solution   Extracorporeal DIALYSIS CONTINUOUS  
 dextrose (D50W) injection syrg 12.5-25 g  25-50 mL IntraVENous PRN  
 insulin regular (NOVOLIN R, HUMULIN R) 100 Units in 0.9% sodium chloride 100 mL infusion  0-50 Units/hr IntraVENous TITRATE  sodium chloride (NS) flush 5-10 mL  5-10 mL IntraVENous PRN  
 sodium chloride (NS) flush 5-40 mL  5-40 mL IntraVENous Q8H  
 sodium chloride (NS) flush 5-40 mL  5-40 mL IntraVENous PRN  
 [Held by provider] heparin (porcine) injection 5,000 Units  5,000 Units SubCUTAneous Q12H  
 dextrose 5% infusion  75 mL/hr IntraVENous CONTINUOUS  
 albuterol-ipratropium (DUO-NEB) 2.5 MG-0.5 MG/3 ML  3 mL Nebulization Q6H RT  
 piperacillin-tazobactam (ZOSYN) 3.375 g in 0.9% sodium chloride (MBP/ADV) 100 mL  3.375 g IntraVENous Q12H  VANCOMYCIN INFORMATION NOTE   Other Rx Dosing/Monitoring  glucose chewable tablet 16 g  4 Tab Oral PRN  
 dextrose (D50W) injection syrg 12.5-25 g  12.5-25 g IntraVENous PRN  
 glucagon (GLUCAGEN) injection 1 mg  1 mg IntraMUSCular PRN  
 fentaNYL (PF) 1,500 mcg/30 mL (50 mcg/mL) infusion  0-200 mcg/hr IntraVENous TITRATE  chlorhexidine (PERIDEX) 0.12 % mouthwash 15 mL  15 mL Oral Q12H  
 morphine injection 2 mg  2 mg IntraVENous Q30MIN PRN  
 NOREPINephrine (LEVOPHED) 8 mg in 5% dextrose 250mL infusion  2-100 mcg/min IntraVENous TITRATE  famotidine (PF) (PEPCID) 20 mg in sodium chloride 0.9% 10 mL injection  20 mg IntraVENous Q24H  
 insulin lispro (HUMALOG) injection   SubCUTAneous Q6H  
 PHENYLephrine (SHILO-SYNEPHRINE) 30 mg in 0.9% sodium chloride 250 mL infusion   mcg/min IntraVENous TITRATE  azithromycin (ZITHROMAX) 500 mg in 0.9% sodium chloride (MBP/ADV) 250 mL  500 mg IntraVENous Q24H  
 
 
 
 LAB AND IMAGING FINDINGS:  
 
Lab Results Component Value Date/Time WBC 17.5 (H) 08/02/2019 05:19 AM  
 HGB 7.6 (L) 08/02/2019 05:19 AM  
 PLATELET 286 66/11/4844 05:19 AM  
 
Lab Results Component Value Date/Time Sodium 157 (H) 08/02/2019 09:38 AM  
 Potassium 5.3 (H) 08/02/2019 09:38 AM  
 Chloride 121 (H) 08/02/2019 09:38 AM  
 CO2 19 (L) 08/02/2019 09:38 AM  
  (H) 08/02/2019 09:38 AM  
 Creatinine 5.15 (H) 08/02/2019 09:38 AM  
 Calcium 6.9 (L) 08/02/2019 09:38 AM  
 Magnesium 2.8 (H) 08/02/2019 05:19 AM  
  
Lab Results Component Value Date/Time AST (SGOT) 33 08/01/2019 12:05 PM  
 Alk. phosphatase 107 08/01/2019 12:05 PM  
 Protein, total 7.9 08/01/2019 12:05 PM  
 Albumin 1.5 (L) 08/01/2019 12:05 PM  
 Globulin 6.4 (H) 08/01/2019 12:05 PM  
 
Lab Results Component Value Date/Time INR 1.9 (H) 08/02/2019 11:37 AM  
 Prothrombin time 18.2 (H) 08/02/2019 11:37 AM  
 aPTT 39.9 (H) 08/02/2019 11:37 AM  
  
No results found for: IRON, FE, TIBC, IBCT, PSAT, FERR No results found for: PH, PCO2, PO2 No components found for: [place] No results found for: CPK, CKMB Total time: 70 min Counseling / coordination time, spent as noted above: 50 min  
> 50% counseling / coordination?: yes Prolonged service was provided for  []30 min   []75 min in face to face time in the presence of the patient, spent as noted above. Time Start:  
Time End:  
Note: this can only be billed with 35076 (initial) or 95630 (follow up). If multiple start / stop times, list each separately.

## 2019-08-02 NOTE — PROGRESS NOTES
Visited Mr Ronal Kee in [address] for ongoing spiritual/emotional support of patient and family. Mr Ronal Kee was lying quietly in bed with eyes closed and no family was present. Was updated by patient's nurse on Mr Chapa's status. : Rev. Tea Keller. Axel Cedeño; [facility], to contact [phone] [address] [facility] call: [phone]

## 2019-08-02 NOTE — PROGRESS NOTES
About 20 min post intubation bp fell followed by bradycardia and pea. ACLS initiatedl ROSC after epi X 1 and cpr 
 
bs equal, rhythm regular, sat now 100 Pt moving on his own 
 
ivf bolus and levophed running Central line placed emergently Bicarb given iv 
abg pending Xray pending He remains critical 
I have spoken with his daughter at bedside, remains full code 
 
 
cct eop 35 min

## 2019-08-03 NOTE — PROGRESS NOTES
Bedside and Verbal shift change report given to Sylvester RN (oncoming nurse) by Madison Kaiser RN (offgoing nurse). Report included the following information SBAR.  
 
0800: Pt intubated, withdrawals X4, BP 170s, HR 90s, sedation off, currently on levophed gtt & vasopressin gtt. 1015: Dr. Clarise Homans at bedside- orders received for decreased D5 gtt to 50 ml/hr & discontinue the latosha. 1200: Reassessment, no changes 1450: Palliative paged per daughters request at bedside. 1505: Orders received by Dr. Clarise Homans to titrate levophed by 4mcg & to turn off vasopressin once pt levophed less than 20mcg. 1705: Dr. Clarise Homans made aware of pt - orders received to admin 10 units from sliding scale order Bedside and Verbal shift change report given to RN (oncoming nurse) by Jeremy Coppola RN (offgoing nurse). Report included the following information Kardex.

## 2019-08-03 NOTE — ACP (ADVANCE CARE PLANNING)
Advance Care Planning Note Name: Nohemi Huerta YOB: 1935 MRN: [id number] Admission Date: 8/1/2019 11:28 AM 
 
Date of discussion: 8/3/2019 Active Diagnoses: 
 
Hospital Problems  Date Reviewed: 8/2/2019 Codes Class Noted POA Cardiac arrest [facility]) ICD-10-CM: I46.9 ICD-9-CM: 427.5  8/2/2019 Unknown * (Principal) Aspiration pneumonia ([facility] [address] ICD-10-CM: J69.0 ICD-9-CM: 507.0  8/1/2019 Unknown These active diagnoses are of sufficient risk that focused discussion on advance care planning is indicated in order to allow the patient to thoughtfully consider personal goals of care, and if situations arise that prevent the ability to personally give input, to ensure appropriate representation of their personal desires for different levels and aggressiveness of care. Discussion:  
 
Persons present and participating in discussion: Bradley Rodríguez MD, Pt's daughter, [facility] and [facility] Discussion:  
Reviewed current medical diagnoses, cardiac arrest x 2, septic shock, acute kidney failure, and malignancy. Daughter expressed concerns with prolonging his suffering, and then also mentioned allowing to wait for him to pass on his own. Explained that with pressors, intubation, we can keep him alive but this would be artificial, and also would prolong his suffering. Explained that we would not withdraw care until she decided she was ready to do so. Affirmed her decision to be DNR. 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 Expressed my concern with his mental status, not following commands or making purposeful movements despite being off sedation. Daughter would like to go home and discuss potential transition to palliative measures with her family. - If she were to make the decision in the middle of the night please: - Order comfort measures orderset, PRN morphine, PRN glycopyyrolate, PRN ativan, and Fentanyl nebs. Perform compassionate extubation at the request of family. Slowly downtitrate his pressors. - I did explain to family that it is possible he may last for hours to days after extubation, but that we would keep him as comfortable as possible in that situation. 
- For now remains no CPR, continue current care. Time Spent:  
 
Total time spent face-to-face in education and discussion: 16 minutes.   
 
Emily Richard MD 
8/3/2019 
4:41 PM

## 2019-08-03 NOTE — PROGRESS NOTES
Pharmacist Note - Vancomycin Dosing Therapy day 3 Indication: Empiric for HAP Current regimen: 1250 mg IV x 1 dose (given 8/1 @ 1248) A Random Level resulted at 11.1 mcg/mL which was obtained ~40.5 hrs post-dose. Goal trough: 15 - 20 mcg/mL Plan: Will order 750mg (~13 mg/kg) IV x 1. Pharmacy will continue to monitor this patient daily for changes in clinical status and renal function.

## 2019-08-03 NOTE — PROGRESS NOTES
Left upper extremity venous duplex completed. 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 Full report to follow.

## 2019-08-03 NOTE — PROGRESS NOTES
PULMONARY/Critical Care/SLEEP MEDICINE  [facility] Name: Tammy Perez : 1935 MRN: [id number] Date: 8/3/2019 7:20 AM  
[]I have reviewed the flowsheet and previous days notes. [x]The patient is unable to give any meaningful history or review of systems because the patient is: 
[x]Intubated []disoriented []Sedated [x]Unresponsive []The patient is critically ill on     
[x]Mechanical ventilation [x]Pressors []BiPAP []  
 
8/3  He remains critically ill on full vent support and 100% FiO2. Basilio Gu He was found to have a DVT in his arm by doppler. He continues to require pressors. He continues to require a NaHCO3 drip. He is unresponsive and is unable to give history. Ins >>Outs. 8/ Seen and examined. Events noted. Now with multisystem organ failure and refractory shock- 3 pressors. Worsening metabolic acidosis, renal failure and hyperkalemia- may be too hemodynamically unstable for RRT. Oozing from CVL insertion site. Cultures pending. Hb 7.6. No coags available for review. CXR with right sided infiltrates and infiltrating lung mass. PEA arrest X 2-- remains a full code. No family at bedside  CTSP by hospitalist for resp failure,  dw pt daughter on phone, she could give no detail of his medical history, reports he goes to [facility] at times, now at [facility], could tell me he had prostate cancer many years ago, that's all, not aware of current medical issues, she did spell the word subcutaneous for some reason. He was sent in with resp distress, pneumonia. I found him in icu on bipap with rr over 35 and mumbling, easily agitated, pulling at fm. I proceeded with intubation. BP now low ROS:Review of systems not obtained due to patient factors. Vital Signs:   
Visit Vitals BP (!) 176/91 Pulse 93 Temp 97.6 °F (36.4 °C) Resp 21 Ht 5' 10\" (1.778 m) Wt 68 kg (149 lb 14.6 oz) SpO2 100% BMI 21.51 kg/m² O2 Device: Endotracheal tube, Ventilator Temp (24hrs), Av.6 °F (36.4 °C), Min:97 °F (36.1 °C), Max:98 °F (36.7 °C) Intake/Output:  
Last shift:      No intake/output data recorded. Last 3 shifts:  1901 -  0700 In: 7861.5 [I.V.:7861.5] Out: 382 [BRKXS:707] Intake/Output Summary (Last 24 hours) at 8/3/2019 1356 Last data filed at 2019 Gross per 24 hour Intake 4839.23 ml Output 73 ml Net 4766.23 ml Physical Exam: 
 
General: [x]Intubated/sedated []No acute distress [] [x]Ill appearing [] [] HEENT: []Icteric []PERRL [x]opacified cornea [x]Anicteric Mucosa:[]moist   [x]dry [x] edentulous Resp: []Wheeze []Clear to ascultation bilaterally []Accessory muscle use [x]Rales []Chest tube:  []Air leak [] []Ronchi []Crepitus [] [x]Coarse bilateral ventilator breath sounds CV: [x]Regular []Tachycardia [] []Irregular []Bradycardic []  
 []Murmur []S3 [] []Edema []S4 []  
  
GI: [x]Soft  [x]NG Tube Bowel sounds: [x]present []absent []Firm []PEG [] []Distended  []  
  
: [x]Shell []Hematuria [] []Clear urine []Edema [] MSK:  
 []SCDs [x]Edema lue [x]muscle wasting []No deformities [] []  
  
Skin: [x]Warm [x]Dry  [] []Cool []Moist [] []Hot  []Diaphoretic [] []Rash  []Cyanosis [] N-psych: [x]Sedated  []Agitated [] []Alert  Follows commands: 
 []Yes  [x]No [] Devices: [x]ET-Tube []Tracheostomy []Chest tube: Air leak? []Y/[]N  
 [x]Central Line []PA Catheter []  
 []PICC [] [] DATA:  
Current Facility-Administered Medications Medication Dose Route Frequency  sodium bicarbonate (8.4%) 150 mEq in dextrose 5% 1,000 mL infusion   IntraVENous CONTINUOUS  
 sodium bicarbonate 8.4 % (1 mEq/mL) injection 50 mEq  50 mEq IntraVENous PRN  
 sodium bicarbonate 8.4 % (1 mEq/mL) injection  vancomycin (VANCOCIN) 750 mg in 0.9% sodium chloride (MBP/ADV) 250 mL  750 mg IntraVENous ONCE  
  vasopressin (VASOSTRICT) 20 Units in 0.9% sodium chloride 100 mL infusion  0.04 Units/min IntraVENous CONTINUOUS  
 hydrocortisone Sod Succ (PF) (SOLU-CORTEF) injection 50 mg  50 mg IntraVENous Q8H  
 glucose chewable tablet 16 g  4 Tab Oral PRN  
 dextrose (D50W) injection syrg 12.5-25 g  25-50 mL IntraVENous PRN  
 glucagon (GLUCAGEN) injection 1 mg  1 mg IntraMUSCular PRN  
 insulin lispro (HUMALOG) injection   SubCUTAneous Q6H  
 heparin 25,000 units in D5W 250 ml infusion  18-36 Units/kg/hr IntraVENous TITRATE  sodium chloride (NS) flush 5-10 mL  5-10 mL IntraVENous PRN  
 sodium chloride (NS) flush 5-40 mL  5-40 mL IntraVENous Q8H  
 sodium chloride (NS) flush 5-40 mL  5-40 mL IntraVENous PRN  
 [Held by provider] heparin (porcine) injection 5,000 Units  5,000 Units SubCUTAneous Q12H  
 dextrose 5% infusion  75 mL/hr IntraVENous CONTINUOUS  
 albuterol-ipratropium (DUO-NEB) 2.5 MG-0.5 MG/3 ML  3 mL Nebulization Q6H RT  
 piperacillin-tazobactam (ZOSYN) 3.375 g in 0.9% sodium chloride (MBP/ADV) 100 mL  3.375 g IntraVENous Q12H  VANCOMYCIN INFORMATION NOTE   Other Rx Dosing/Monitoring  glucose chewable tablet 16 g  4 Tab Oral PRN  
 glucagon (GLUCAGEN) injection 1 mg  1 mg IntraMUSCular PRN  
 fentaNYL (PF) 1,500 mcg/30 mL (50 mcg/mL) infusion  0-200 mcg/hr IntraVENous TITRATE  chlorhexidine (PERIDEX) 0.12 % mouthwash 15 mL  15 mL Oral Q12H  
 morphine injection 2 mg  2 mg IntraVENous Q30MIN PRN  
 NOREPINephrine (LEVOPHED) 8 mg in 5% dextrose 250mL infusion  2-100 mcg/min IntraVENous TITRATE  famotidine (PF) (PEPCID) 20 mg in sodium chloride 0.9% 10 mL injection  20 mg IntraVENous Q24H  
 PHENYLephrine (SHILO-SYNEPHRINE) 30 mg in 0.9% sodium chloride 250 mL infusion   mcg/min IntraVENous TITRATE  azithromycin (ZITHROMAX) 500 mg in 0.9% sodium chloride (MBP/ADV) 250 mL  500 mg IntraVENous Q24H Telemetry: [x]Sinus []A-flutter []Paced []A-fib []Multiple PVCs Labs: 
Recent Labs 08/03/19 0718 08/02/19 2148 08/02/19 
7284 WBC 19.2* 19.6* 17.5* HGB 6.9* 7.0* 7.6* HCT 23.7* 24.7* 27.1*  
* 116* 152 Recent Labs 08/03/19 
[id number] 08/02/19 2148 08/02/19 66 65 76 08/02/19 
6834 08/02/19 
0519 08/01/19 
1205 * 150*  --  157* 159* 163*  
K 5.5* 5.8*  --  5.3* 5.5* 5.1 * 114*  --  121* 126* 129* CO2 24 22  --  19* 19* 21  
* 239*  --  307* 327* 115* * 106*  --  109* 103* 103* CREA 4.75* 4.74*  --  5.15* 4.87* 4.71* CA 6.2* 6.5*  --  6.9* 6.9* 8.8 MG 2.4  --   --   --  2.8*  --   
ALB 1.2*  --   --   --   --  1.5* TBILI 1.0  --   --   --   --  0.4 SGOT 1,038*  --   --   --   --  33 *  --   --   --   --  17 INR  --   --  1.9*  --   --   -- No results for input(s): PH, PCO2, PO2, HCO3, FIO2 in the last 72 hours. Imaging: 
[x]I have personally reviewed the patients radiographsmultifocal rt lung infiltrate, anterior mass, emphysema []Radiographs reviewed with radiologist 
 []No change from prior, tubes and lines in adequate position []Improved   []Worsening IMPRESSION:  
· Respiratory Failure requiring full support/FiO2 1.00 · Multisystem organ failure · Multifocal pneumonia · Chest mass:  History of prostate cancer · Arm DVT: on heparin · Sepsis, hypotension: continues to require pressors · ARF:  On NaHCO3 drip; Family needs to decide about RRT. · Emphysema · Blind · LUE edema · Anemia · Lung mass, hx of prostate cancer · Hypernatremia: 
· Unknown LV fx · Very poor prognosis PLAN:  
· Continue Vent : ac vent peep 10 wean fio2, low vt 
· Pressors (NE, Vaso, PE): adjust and wean as tolerated · ssi for bs control · On bicarb drip · On D5 · Sedation reviewed · On stress dose steroids · On pepcid · Vent bundle · Continue Iv abx vanc, azithro, zosyn · SCDs · Improve glucose control · Critically ill, at high risk for decline and death.  Also has an infiltrating mass in the lung, mediastinum and sternum-- with performance status poor before onset of current illness, he is not likely to be a candidate for any treatment. The patient is: [] acutely ill Risk of deterioration: [] moderate [x] critically ill  [x] high []See my orders for details My assessment/plan was discussed with: 
[x]nursing []PT/OT   
[]respiratory therapy []Dr. Shaun Palomo [] [x]Total critical care time exclusive of procedures     30  minutes Sruthi Tillman MD, [facility]

## 2019-08-03 NOTE — PROGRESS NOTES
Hospitalist Progress Note Brody Russell MD 
Answering service: [phone] OR [phone] from in house phone Date of Service:  8/3/2019 NAME:  Tammy Perez :  1935 MRN:  [id number] Admission Summary:  
The patient is an 53-year-old gentleman who has previous history significant for hypertension, diabetes, chronic kidney disease, and COPD. There is no family available at this time. I did call [facility] where the nurse tells me that he has been there for the last 3 weeks. The patient has previous history of aspiration pneumonia requiring admission at [facility] and has been on nectar-thickened liquids since then. He was found to be hypernatremic yesterday. His sodium was 168. He was given D5 water 1 liter at Nursing home. The patient, this morning, was found to be hypoxic. Interval history / Subjective:  
Lactate down trending, Pressor requirement much less now on 30 Levophed and Vaso, off Mt Ongoing hyperglycemia on D5. Increased UOP noted overnight as well. No purposeful movements, not following commands at this time. Assessment & Plan: PEA arrest x 2 - likely secondary to sepsis as below. - Echo pending Severe Septic shock - likely with source aspiration PNA, with end organ damage (lactic acidosis, acute renal failure, acute respiratory failure) - HCAP coverage with Vanc, Zosyn, and azithro - Sputum culture - F/U blood cultures - On multiple pressors: levophed, vaso, down titrate for MAP 65 
- SDS 
- Trend lactate, persistently high - Trend procalcitonin Acute hypoxic respiratory failure - intubated. - intensivist following, appreciate recs - Sedation per intensivist, now off - CXR daily - ABG daily 
- vent bundle Acute renal failure, hyperkalemia  - oliguria - Nephrology consulted - Family decided not to pursue CVVH 
- Shell placed, continue strict I and O  
 - IV bicarb for now UE DVT - started on heparin gtt 8/3 AG Metabolic acidosis - combination of sepsis, lactic acidosis, renal failure. Montior, on bicarb gtt. Persistent lactic acidosis - as high as 10, secondary to severe septic shock, continue to monitor, down trending. Large lung mass with extension into mediastinum - no known h/o lung cancer, but also usually gets his care at [facility]. Palliative following. Type 2 diabetes with hyperglycemia - elevated, SSI, POCT glucose checks and hypoglycemia protocol. DC D5 Hypernatremia - secondary to poor PO intake, continue D5W, nephrology following HTN - holding all medications due to septic shock H/o COPD - nebs PRN 
H/o prostate cancer - unknown stage, or how advanced. Anemia - likely acute on chronic from chronic disease, transfuse for hemoglobin less than 7, continue to monitor Code status: no CPR. Continue pressors, and intubation for now. Re-evaluate daily with family. DVT prophylaxis: SCDs Care Plan discussed with: Patient/Family Disposition: TBD Hospital Problems  Date Reviewed: 8/2/2019 Codes Class Noted POA Cardiac arrest [facility]) ICD-10-CM: I46.9 ICD-9-CM: 427.5  8/2/2019 Unknown * (Principal) Aspiration pneumonia ([facility] [address] ICD-10-CM: J69.0 ICD-9-CM: 507.0  8/1/2019 Unknown Review of Systems:  
Review of systems not obtained due to patient factors. Vital Signs:  
 Last 24hrs VS reviewed since prior progress note. Most recent are: 
Visit Vitals /79 (BP 1 Location: Right arm, BP Patient Position: At rest) Pulse 95 Temp 97 °F (36.1 °C) Resp 20 Ht 5' 10\" (1.778 m) Wt 68 kg (149 lb 14.6 oz) SpO2 100% BMI 21.51 kg/m² Intake/Output Summary (Last 24 hours) at 8/3/2019 1648 Last data filed at 8/3/2019 1600 Gross per 24 hour Intake 7583.35 ml Output 345 ml Net 7238.35 ml Physical Examination:  
 
 
     
Constitutional:  Intubated, sedated. ENT:  Oral mucous moist, oropharynx benign. + temporal wasting Resp:  Course bilaterally R> L, no increased work of breathing, ETT in place. CV:  Regular rhythm, + Tachycardic, no murmurs, gallops, rubs GI:  Soft, non distended, non tender. normoactive bowel sounds, no hepatosplenomegaly Musculoskeletal:  No edema, warm, 2+ pulses throughout Neurologic:  Intubated and not following commands for me. Skin:  Good turgor, no rashes or ulcers Data Review:  
Reviewed all imaging and laboratory data. CT scan personally reviewed. Labs:  
 
Recent Labs 08/03/19 
3777 08/02/19 2148 WBC 19.2* 19.6* HGB 6.9* 7.0*  
HCT 23.7* 24.7*  
* 116* Recent Labs 08/03/19 
4876 08/03/19 
6104 08/02/19 2148 08/02/19 
4065  146* 150*   < > 159*  
K 5.3* 5.5* 5.8*   < > 5.5*  
 109* 114*   < > 126* CO2 26 24 22   < > 19* * 108* 106*   < > 103* CREA 4.77* 4.75* 4.74*   < > 4.87* * 299* 239*   < > 327* CA 6.1* 6.2* 6.5*   < > 6.9*  
MG  --  2.4  --   --  2.8*  
 < > = values in this interval not displayed. Recent Labs 08/03/19 
0161 08/01/19 
1205 SGOT 1,038* 33 * 17  
 107 TBILI 1.0 0.4 TP 6.2* 7.9 ALB 1.2* 1.5*  
GLOB 5.0* 6.4* Recent Labs 08/03/19 
0848 08/03/19 
6119 08/02/19 2148 08/02/19 [address] INR  --   --   --  1.9* PTP  --   --   --  18.2* APTT 66.1* 102.5* 42.4* 39.9* No results for input(s): FE, TIBC, PSAT, FERR in the last 72 hours. No results found for: FOL, RBCF No results for input(s): PH, PCO2, PO2 in the last 72 hours. Recent Labs 08/02/19 
6708 TROIQ 0.30* No results found for: CHOL, CHOLX, CHLST, CHOLV, HDL, LDL, LDLC, DLDLP, TGLX, TRIGL, TRIGP, CHHD, CHHDX Lab Results Component Value Date/Time  Glucose (POC) 324 (H) 08/03/2019 12:05 PM  
 Glucose (POC) 350 (H) 08/03/2019 06:42 AM  
 Glucose (POC) 248 (H) 08/03/2019 12:20 AM  
 Glucose (POC) 249 (H) 08/02/2019 06:34 PM  
 Glucose (POC) 164 (H) 08/02/2019 04:20 PM  
 
Lab Results Component Value Date/Time Color YELLOW/STRAW 08/01/2019 12:45 PM  
 Appearance TURBID (A) 08/01/2019 12:45 PM  
 Specific gravity 1.019 08/01/2019 12:45 PM  
 pH (UA) 6.0 08/01/2019 12:45 PM  
 Protein 100 (A) 08/01/2019 12:45 PM  
 Glucose NEGATIVE  08/01/2019 12:45 PM  
 Ketone NEGATIVE  08/01/2019 12:45 PM  
 Bilirubin NEGATIVE  08/01/2019 12:45 PM  
 Urobilinogen 1.0 08/01/2019 12:45 PM  
 Nitrites NEGATIVE  08/01/2019 12:45 PM  
 Leukocyte Esterase LARGE (A) 08/01/2019 12:45 PM  
 Epithelial cells MODERATE (A) 08/01/2019 12:45 PM  
 Bacteria NEGATIVE  08/01/2019 12:45 PM  
 WBC 0-4 08/01/2019 12:45 PM  
 RBC 0-5 08/01/2019 12:45 PM  
 
 
 
Medications Reviewed:  
 
Current Facility-Administered Medications Medication Dose Route Frequency  sodium bicarbonate (8.4%) 150 mEq in dextrose 5% 1,000 mL infusion   IntraVENous CONTINUOUS  
 sodium bicarbonate 8.4 % (1 mEq/mL) injection 50 mEq  50 mEq IntraVENous PRN  
 [START ON 8/4/2019] Vancomycin, Random - Please draw on Sunday, 8/04 @ 0800. Thanks! Other ONCE  
 vasopressin (VASOSTRICT) 20 Units in 0.9% sodium chloride 100 mL infusion  0.04 Units/min IntraVENous CONTINUOUS  
 hydrocortisone Sod Succ (PF) (SOLU-CORTEF) injection 50 mg  50 mg IntraVENous Q8H  
 glucose chewable tablet 16 g  4 Tab Oral PRN  
 dextrose (D50W) injection syrg 12.5-25 g  25-50 mL IntraVENous PRN  
 glucagon (GLUCAGEN) injection 1 mg  1 mg IntraMUSCular PRN  
 insulin lispro (HUMALOG) injection   SubCUTAneous Q6H  
 heparin 25,000 units in D5W 250 ml infusion  18-36 Units/kg/hr IntraVENous TITRATE  sodium chloride (NS) flush 5-10 mL  5-10 mL IntraVENous PRN  
 sodium chloride (NS) flush 5-40 mL  5-40 mL IntraVENous Q8H  
 sodium chloride (NS) flush 5-40 mL  5-40 mL IntraVENous PRN  
 [Held by provider] heparin (porcine) injection 5,000 Units  5,000 Units SubCUTAneous Q12H  
 dextrose 5% infusion  50 mL/hr IntraVENous CONTINUOUS  
 albuterol-ipratropium (DUO-NEB) 2.5 MG-0.5 MG/3 ML  3 mL Nebulization Q6H RT  
 piperacillin-tazobactam (ZOSYN) 3.375 g in 0.9% sodium chloride (MBP/ADV) 100 mL  3.375 g IntraVENous Q12H  VANCOMYCIN INFORMATION NOTE   Other Rx Dosing/Monitoring  glucose chewable tablet 16 g  4 Tab Oral PRN  
 glucagon (GLUCAGEN) injection 1 mg  1 mg IntraMUSCular PRN  
 fentaNYL (PF) 1,500 mcg/30 mL (50 mcg/mL) infusion  0-200 mcg/hr IntraVENous TITRATE  chlorhexidine (PERIDEX) 0.12 % mouthwash 15 mL  15 mL Oral Q12H  
 morphine injection 2 mg  2 mg IntraVENous Q30MIN PRN  
 NOREPINephrine (LEVOPHED) 8 mg in 5% dextrose 250mL infusion  2-100 mcg/min IntraVENous TITRATE  famotidine (PF) (PEPCID) 20 mg in sodium chloride 0.9% 10 mL injection  20 mg IntraVENous Q24H  
 azithromycin (ZITHROMAX) 500 mg in 0.9% sodium chloride (MBP/ADV) 250 mL  500 mg IntraVENous Q24H  
 
______________________________________________________________________ EXPECTED LENGTH OF STAY: 4d 19h ACTUAL LENGTH OF STAY:          2 
 
            
Jesus Davis MD

## 2019-08-04 NOTE — PROGRESS NOTES
Bedside shift change report received from Toribio [place], [place]. Report included the following information SBAR, Kardex, Procedure Summary, Intake/Output, MAR and Recent Results. 1550: PTT therapeutic. 1630: Continuous fentanyl restarted 2nd elevated RR, tachycardia, setting off vent. Pt appears to be uncomfortable.

## 2019-08-04 NOTE — PROGRESS NOTES
Bedside and Verbal shift change report given to Sylvester RN (oncoming nurse) by Gemma Justice RN (offgoing nurse). Report included the following information SBAR.  
  
0800: Pt intubated, withdrawals X4, eyes open spontaneously, BP 130s, HR 110s, sedation off, currently on levophed gtt. Plan for comfort. 1200: Reassessment, no changes

## 2019-08-04 NOTE — PROGRESS NOTES
PULMONARY/Critical Care/SLEEP MEDICINE  [facility] [address] [facility] Name: Mili Kimball : 1935 MRN: [id number] Date: 2019 8:10 AM  
[]I have reviewed the flowsheet and previous days notes. [x]The patient is unable to give any meaningful history or review of systems because the patient is: 
[x]Intubated []disoriented []Sedated [x]Unresponsive []The patient is critically ill on     
[x]Mechanical ventilation [x]Pressors []BiPAP []  
 
 He is making urine and is on less pressors. He has a good VT on PS of 5 and his FiO2 is down to to 50%. He is moving around. His daughter is planning on making him comfort care today. 8/3  He remains critically ill on full vent support and 100% FiO2. Velora Sis He was found to have a DVT in his arm by doppler. He continues to require pressors. He continues to require a NaHCO3 drip. He is unresponsive and is unable to give history. Ins >>Outs.  Seen and examined. Events noted. Now with multisystem organ failure and refractory shock- 3 pressors. Worsening metabolic acidosis, renal failure and hyperkalemia- may be too hemodynamically unstable for RRT. Oozing from CVL insertion site. Cultures pending. Hb 7.6. No coags available for review. CXR with right sided infiltrates and infiltrating lung mass. PEA arrest X 2-- remains a full code. No family at bedside  CTSP by hospitalist for resp failure,  dw pt daughter on phone, she could give no detail of his medical history, reports he goes to [facility] at times, now at [facility], could tell me he had prostate cancer many years ago, that's all, not aware of current medical issues, she did spell the word subcutaneous for some reason. He was sent in with resp distress, pneumonia. I found him in icu on bipap with rr over 35 and mumbling, easily agitated, pulling at fm. I proceeded with intubation. BP now low ROS:Review of systems not obtained due to patient factors. Vital Signs:   
Visit Vitals /77 Pulse (!) 111 Temp 97.6 °F (36.4 °C) Resp 20 Ht 5' 10\" (1.778 m) Wt 72.2 kg (159 lb 2.8 oz) SpO2 100% BMI 22.84 kg/m² O2 Device: Endotracheal tube, Ventilator Temp (24hrs), Av.5 °F (36.4 °C), Min:97 °F (36.1 °C), Max:97.8 °F (36.6 °C) Intake/Output:  
Last shift:      No intake/output data recorded. Last 3 shifts:  1901 -  0700 In: 8475.7 [I.V.:8475.7] Out: 1395 [GOXK] Intake/Output Summary (Last 24 hours) at 2019 2997 Last data filed at 2019 0700 Gross per 24 hour Intake 3947.23 ml Output 1350 ml Net 2597.23 ml Physical Exam: 
 
General: [x]Intubated/sedated [x]No acute distress [] [x]Ill appearing [] [] HEENT: []Icteric []PERRL [x]opacified cornea [x]Anicteric Mucosa:[]moist   [x]dry [x] edentulous Resp: []Wheeze []Clear to ascultation bilaterally []Accessory muscle use  
 []Rales []Chest tube:  []Air leak [] []Ronchi []Crepitus [] [x]Coarse bilateral ventilator breath sounds CV: [x]Regular []Tachycardia [] []Irregular []Bradycardic []  
 []Murmur []S3 [] []Edema []S4 []  
  
GI: [x]Soft  [x]NG Tube Bowel sounds: [x]present []absent []Firm []PEG [] []Distended  []  
  
: [x]Shell []Hematuria [] [x]Clear urine []Edema [] MSK:  
 []SCDs [x]Edema lue [x]muscle wasting []No deformities [] []  
  
Skin: [x]Warm [x]Dry  [] []Cool []Moist [] []Hot  []Diaphoretic [] []Rash  []Cyanosis [] N-psych: []Sedated  []Agitated [] []Alert  Follows commands: 
 []Yes  [x]No [] Devices: [x]ET-Tube []Tracheostomy []Chest tube: Air leak? []Y/[]N  
 [x]Central Line []PA Catheter []  
 []PICC [] [] DATA:  
Current Facility-Administered Medications Medication Dose Route Frequency  calcium gluconate 2 g in 0.9% sodium chloride 100 mL IVPB  2 g IntraVENous ONCE  
 sodium bicarbonate (8.4%) 150 mEq in dextrose 5% 1,000 mL infusion IntraVENous CONTINUOUS  
 sodium bicarbonate 8.4 % (1 mEq/mL) injection 50 mEq  50 mEq IntraVENous PRN  Vancomycin, Random - Please draw on Sunday, 8/04 @ 0800. Thanks! Other ONCE  
 insulin glargine (LANTUS) injection 10 Units  10 Units SubCUTAneous DAILY  vasopressin (VASOSTRICT) 20 Units in 0.9% sodium chloride 100 mL infusion  0.04 Units/min IntraVENous CONTINUOUS  
 hydrocortisone Sod Succ (PF) (SOLU-CORTEF) injection 50 mg  50 mg IntraVENous Q8H  
 glucose chewable tablet 16 g  4 Tab Oral PRN  
 dextrose (D50W) injection syrg 12.5-25 g  25-50 mL IntraVENous PRN  
 glucagon (GLUCAGEN) injection 1 mg  1 mg IntraMUSCular PRN  
 insulin lispro (HUMALOG) injection   SubCUTAneous Q6H  
 heparin 25,000 units in D5W 250 ml infusion  18-36 Units/kg/hr IntraVENous TITRATE  sodium chloride (NS) flush 5-10 mL  5-10 mL IntraVENous PRN  
 sodium chloride (NS) flush 5-40 mL  5-40 mL IntraVENous Q8H  
 sodium chloride (NS) flush 5-40 mL  5-40 mL IntraVENous PRN  
 [Held by provider] heparin (porcine) injection 5,000 Units  5,000 Units SubCUTAneous Q12H  
 dextrose 5% infusion  50 mL/hr IntraVENous CONTINUOUS  
 albuterol-ipratropium (DUO-NEB) 2.5 MG-0.5 MG/3 ML  3 mL Nebulization Q6H RT  
 piperacillin-tazobactam (ZOSYN) 3.375 g in 0.9% sodium chloride (MBP/ADV) 100 mL  3.375 g IntraVENous Q12H  VANCOMYCIN INFORMATION NOTE   Other Rx Dosing/Monitoring  glucose chewable tablet 16 g  4 Tab Oral PRN  
 glucagon (GLUCAGEN) injection 1 mg  1 mg IntraMUSCular PRN  
 fentaNYL (PF) 1,500 mcg/30 mL (50 mcg/mL) infusion  0-200 mcg/hr IntraVENous TITRATE  chlorhexidine (PERIDEX) 0.12 % mouthwash 15 mL  15 mL Oral Q12H  
 morphine injection 2 mg  2 mg IntraVENous Q30MIN PRN  
 NOREPINephrine (LEVOPHED) 8 mg in 5% dextrose 250mL infusion  2-100 mcg/min IntraVENous TITRATE  famotidine (PF) (PEPCID) 20 mg in sodium chloride 0.9% 10 mL injection  20 mg IntraVENous Q24H  azithromycin (ZITHROMAX) 500 mg in 0.9% sodium chloride (MBP/ADV) 250 mL  500 mg IntraVENous Q24H Telemetry: [x]Sinus []A-flutter []Paced []A-fib []Multiple PVCs Labs: 
Recent Labs 08/04/19 
7743 08/03/19 
0152 08/02/19 
2148 WBC 18.9* 19.2* 19.6* HGB 6.5* 6.9* 7.0*  
HCT 21.5* 23.7* 24.7*  
* 107* 116* Recent Labs 08/04/19 
0670 08/04/19 
0020 08/03/19 
1630  08/03/19 
0512  08/02/19 66 65 76  08/02/19 
0519 08/01/19 
1205  144 142   < > 146*   < >  --    < > 159* 163* K 3.8 4.2 4.9   < > 5.5*   < >  --    < > 5.5* 5.1  102 104   < > 109*   < >  --    < > 126* 129* CO2 31 29 26   < > 24   < >  --    < > 19* 21  
* 375* 388*   < > 299*   < >  --    < > 327* 115* * 105* 106*   < > 108*   < >  --    < > 103* 103* CREA 4.51* 4.53* 4.63*   < > 4.75*   < >  --    < > 4.87* 4.71* CA 6.4* 6.7* 6.2*   < > 6.2*   < >  --    < > 6.9* 8.8 MG 2.1  --   --   --  2.4  --   --   --  2.8*  --   
PHOS 6.2*  --   --   --   --   --   --   --   --   --   
ALB 1.0*  --  1.1*  --  1.2*  --   --   --   --  1.5* TBILI  --   --   --   --  1.0  --   --   --   --  0.4 SGOT  --   --   --   --  1,038*  --   --   --   --  33 ALT  --   --   --   --  221*  --   --   --   --  17 INR  --   --   --   --   --   --  1.9*  --   --   --   
 < > = values in this interval not displayed. Imaging: 
[x]I have personally reviewed the patients radiographsmultifocal rt lung infiltrate, anterior mass, emphysema. No new films today []Radiographs reviewed with radiologist 
 []No change from prior, tubes and lines in adequate position []Improved   []Worsening IMPRESSION:  
· Respiratory Failure:  Better. Currently on SBT · Multisystem organ failure: better · Multifocal pneumonia · Chest mass:  History of prostate cancer · Left Arm DVT: on heparin · Sepsis, hypotension: weaning pressors · ARF:  BUN/Cr remain up but he is making urine. · Emphysema · Blind · Anemia · Lung mass, hx of prostate cancer · Hypernatremia: 
· Unknown LV fx · Very poor prognosis PLAN:  
· Continue SBT. Plans for extubation and comfort care later today · Continue to wean pressors · Bolus Fentanyl for comfort as needed · ssi for bs control · Continue bicarb drip · On D5 
· On stress dose steroids · On pepcid · Vent bundle · Continue Iv abx vanc, azithro, zosyn · SCDs The patient is: [] acutely ill Risk of deterioration: [] moderate [x] critically ill  [x] high []See my orders for details My assessment/plan was discussed with: 
[x]nursing []PT/OT   
[]respiratory therapy []Dr. Ta Him [] [x]Total critical care time exclusive of procedures     30  minutes Kirby Bullock MD, [facility] [address] [facility]

## 2019-08-04 NOTE — PROGRESS NOTES
Hospitalist Progress Note Maggie Lynne MD 
Answering service: [phone] OR [phone] from in house phone Date of Service:  2019 NAME:  Federico Fisher :  1935 MRN:  [id number] Admission Summary:  
The patient is an 72-year-old gentleman who has previous history significant for hypertension, diabetes, chronic kidney disease, and COPD. There is no family available at this time. I did call [facility] where the nurse tells me that he has been there for the last 3 weeks. The patient has previous history of aspiration pneumonia requiring admission at [facility] and has been on nectar-thickened liquids since then. He was found to be hypernatremic yesterday. His sodium was 168. He was given D5 water 1 liter at Nursing home. The patient, this morning, was found to be hypoxic. Interval history / Subjective:  
Pressor requirement decreasing, now on 10 of levophed. Ischemic digits noted. Not following any commands, non purposeful movement. Remains off sedation. Had long discussion again today with his daughter she is thinking about withdrawing care tomorrow but has not yet made a decision. Assessment & Plan: PEA arrest x 2 - likely secondary to sepsis as below. - Echo pending Severe Septic shock - likely with source aspiration PNA, with end organ damage (lactic acidosis, acute renal failure, acute respiratory failure). Digit necrosis - HCAP coverage with Vanc, Zosyn, and azithro - Sputum culture --> MRSA, Enterobacter, 
- F/U blood cultures - On multiple pressors: levophed, vaso, down titrate for MAP 65 
- SDS 
- Trend lactate, down trending - Trend procalcitonin Acute hypoxic respiratory failure - intubated. - intensivist following, appreciate recs - Sedation per intensivist, now off - CXR daily - ABG daily 
- vent bundle Acute renal failure, hyperkalemia  - urine output is improving. - Nephrology consulted - Family decided not to pursue CVVH 
- Shell placed, continue strict I and O  
- IV bicarb for now UE DVT - started on heparin gtt 8/3 AG Metabolic acidosis - combination of sepsis, lactic acidosis, renal failure. Montior, on bicarb gtt. improving Persistent lactic acidosis - improving. as high as 10, secondary to severe septic shock, continue to monitor, down trending. Large lung mass with extension into mediastinum - no known h/o lung cancer, but also usually gets his care at [facility]. Palliative following. Type 2 diabetes with hyperglycemia - elevated, SSI, POCT glucose checks and hypoglycemia protocol. DC D5 Hypernatremia - secondary to poor PO intake, continue D5W, nephrology following HTN - holding all medications due to septic shock H/o COPD - nebs PRN 
H/o prostate cancer - unknown stage, or how advanced. Anemia - likely acute on chronic from chronic disease, transfuse for hemoglobin less than 7, continue to monitor Code status: no CPR. Continue pressors, and intubation for now. Re-evaluate daily with family. DVT prophylaxis: SCDs Care Plan discussed with: Patient/Family Disposition: TBD Hospital Problems  Date Reviewed: 8/2/2019 Codes Class Noted POA Cardiac arrest [facility]) ICD-10-CM: I46.9 ICD-9-CM: 427.5  8/2/2019 Unknown * (Principal) Aspiration pneumonia ([facility] [address] ICD-10-CM: J69.0 ICD-9-CM: 507.0  8/1/2019 Unknown Review of Systems:  
Review of systems not obtained due to patient factors. Vital Signs:  
 Last 24hrs VS reviewed since prior progress note. Most recent are: 
Visit Vitals /74 Pulse (!) 119 Temp 96.9 °F (36.1 °C) Resp 20 Ht 5' 10\" (1.778 m) Wt 72.2 kg (159 lb 2.8 oz) SpO2 100% BMI 22.84 kg/m² Intake/Output Summary (Last 24 hours) at 8/4/2019 1518 Last data filed at 8/4/2019 1500 Gross per 24 hour Intake 4724.66 ml Output 1815 ml Net 2909.66 ml Physical Examination: Constitutional:  Intubated, sedated. ENT:  Oral mucous moist, oropharynx benign. + temporal wasting Resp:  Course bilaterally R> L, no increased work of breathing, ETT in place. CV:  Regular rhythm, + Tachycardic, no murmurs, gallops, rubs GI:  Soft, non distended, non tender. normoactive bowel sounds, no hepatosplenomegaly Musculoskeletal:  No edema, warm, 2+ pulses throughout, necrotic digits. Neurologic:  Intubated and not following commands for me. Skin:  Good turgor, no rashes or ulcers Data Review:  
Reviewed all imaging and laboratory data. CT scan personally reviewed. Labs:  
 
Recent Labs 08/04/19 
8739 08/03/19 
8071 WBC 18.9* 19.2* HGB 6.5* 6.9*  
HCT 21.5* 23.7*  
* 107* Recent Labs 08/04/19 
0589 08/04/19 
0020 08/03/19 
1630  08/03/19 
6452  08/02/19 
4697  144 142   < > 146*   < > 159*  
K 3.8 4.2 4.9   < > 5.5*   < > 5.5*  
 102 104   < > 109*   < > 126* CO2 31 29 26   < > 24   < > 19* * 105* 106*   < > 108*   < > 103* CREA 4.51* 4.53* 4.63*   < > 4.75*   < > 4.87* * 375* 388*   < > 299*   < > 327* CA 6.4* 6.7* 6.2*   < > 6.2*   < > 6.9*  
MG 2.1  --   --   --  2.4  --  2.8* PHOS 6.2*  --   --   --   --   --   --   
 < > = values in this interval not displayed. Recent Labs 08/04/19 
4386 08/03/19 
1630 08/03/19 
4162 SGOT  --   --  1,038* ALT  --   --  221* AP  --   --  114 TBILI  --   --  1.0 TP  --   --  6.2* ALB 1.0* 1.1* 1.2*  
GLOB  --   --  5.0* Recent Labs 08/04/19 
0914 08/04/19 
0020 08/03/19 
1626  08/02/19 [address] INR  --   --   --   --  1.9* PTP  --   --   --   --  18.2* APTT 62.0* 92.5* 98.2*   < > 39.9*  
 < > = values in this interval not displayed. No results for input(s): FE, TIBC, PSAT, FERR in the last 72 hours. No results found for: FOL, RBCF No results for input(s): PH, PCO2, PO2 in the last 72 hours. Recent Labs 08/02/19 0122 TROIQ 0.30* No results found for: CHOL, CHOLX, CHLST, CHOLV, HDL, LDL, LDLC, DLDLP, TGLX, TRIGL, TRIGP, CHHD, CHHDX Lab Results Component Value Date/Time Glucose (POC) 324 (H) 08/04/2019 11:30 AM  
 Glucose (POC) 320 (H) 08/04/2019 06:27 AM  
 Glucose (POC) 382 (H) 08/04/2019 12:28 AM  
 Glucose (POC) 410 (H) 08/03/2019 05:05 PM  
 Glucose (POC) 324 (H) 08/03/2019 12:05 PM  
 
Lab Results Component Value Date/Time Color YELLOW/STRAW 08/01/2019 12:45 PM  
 Appearance TURBID (A) 08/01/2019 12:45 PM  
 Specific gravity 1.019 08/01/2019 12:45 PM  
 pH (UA) 6.0 08/01/2019 12:45 PM  
 Protein 100 (A) 08/01/2019 12:45 PM  
 Glucose NEGATIVE  08/01/2019 12:45 PM  
 Ketone NEGATIVE  08/01/2019 12:45 PM  
 Bilirubin NEGATIVE  08/01/2019 12:45 PM  
 Urobilinogen 1.0 08/01/2019 12:45 PM  
 Nitrites NEGATIVE  08/01/2019 12:45 PM  
 Leukocyte Esterase LARGE (A) 08/01/2019 12:45 PM  
 Epithelial cells MODERATE (A) 08/01/2019 12:45 PM  
 Bacteria NEGATIVE  08/01/2019 12:45 PM  
 WBC 0-4 08/01/2019 12:45 PM  
 RBC 0-5 08/01/2019 12:45 PM  
 
 
 
Medications Reviewed:  
 
Current Facility-Administered Medications Medication Dose Route Frequency  fentaNYL citrate (PF) injection 25 mcg  25 mcg IntraVENous Q4H PRN  
 sodium bicarbonate (8.4%) 150 mEq in dextrose 5% 1,000 mL infusion   IntraVENous CONTINUOUS  
 sodium bicarbonate 8.4 % (1 mEq/mL) injection 50 mEq  50 mEq IntraVENous PRN  
 insulin glargine (LANTUS) injection 10 Units  10 Units SubCUTAneous DAILY  vasopressin (VASOSTRICT) 20 Units in 0.9% sodium chloride 100 mL infusion  0.04 Units/min IntraVENous CONTINUOUS  
 hydrocortisone Sod Succ (PF) (SOLU-CORTEF) injection 50 mg  50 mg IntraVENous Q8H  
 glucose chewable tablet 16 g  4 Tab Oral PRN  
 dextrose (D50W) injection syrg 12.5-25 g  25-50 mL IntraVENous PRN  
 glucagon (GLUCAGEN) injection 1 mg  1 mg IntraMUSCular PRN  
 insulin lispro (HUMALOG) injection   SubCUTAneous Q6H  
  heparin 25,000 units in D5W 250 ml infusion  18-36 Units/kg/hr IntraVENous TITRATE  sodium chloride (NS) flush 5-10 mL  5-10 mL IntraVENous PRN  
 sodium chloride (NS) flush 5-40 mL  5-40 mL IntraVENous Q8H  
 sodium chloride (NS) flush 5-40 mL  5-40 mL IntraVENous PRN  
 [Held by provider] heparin (porcine) injection 5,000 Units  5,000 Units SubCUTAneous Q12H  
 dextrose 5% infusion  50 mL/hr IntraVENous CONTINUOUS  
 albuterol-ipratropium (DUO-NEB) 2.5 MG-0.5 MG/3 ML  3 mL Nebulization Q6H RT  
 piperacillin-tazobactam (ZOSYN) 3.375 g in 0.9% sodium chloride (MBP/ADV) 100 mL  3.375 g IntraVENous Q12H  VANCOMYCIN INFORMATION NOTE   Other Rx Dosing/Monitoring  glucose chewable tablet 16 g  4 Tab Oral PRN  
 glucagon (GLUCAGEN) injection 1 mg  1 mg IntraMUSCular PRN  
 fentaNYL (PF) 1,500 mcg/30 mL (50 mcg/mL) infusion  0-200 mcg/hr IntraVENous TITRATE  chlorhexidine (PERIDEX) 0.12 % mouthwash 15 mL  15 mL Oral Q12H  
 morphine injection 2 mg  2 mg IntraVENous Q30MIN PRN  
 NOREPINephrine (LEVOPHED) 8 mg in 5% dextrose 250mL infusion  2-100 mcg/min IntraVENous TITRATE  famotidine (PF) (PEPCID) 20 mg in sodium chloride 0.9% 10 mL injection  20 mg IntraVENous Q24H  
 azithromycin (ZITHROMAX) 500 mg in 0.9% sodium chloride (MBP/ADV) 250 mL  500 mg IntraVENous Q24H  
 
______________________________________________________________________ EXPECTED LENGTH OF STAY: 4d 19h ACTUAL LENGTH OF STAY:          3 
 
            
Sohail Fernandez MD

## 2019-08-04 NOTE — PROGRESS NOTES
Pharmacist Note - Vancomycin Dosing Therapy day 4 Indication: Empiric for HAP 
- Large RUL malignancy, MOSF Current regimen: dosing by levels A Random Level resulted at 14.3 mcg/mL which was obtained 24.25 hrs post-dose. Goal trough: 15 - 20 mcg/mL Plan: Ordered 750mg IV x1. Pharmacy will continue to monitor this patient daily for changes in clinical status and renal function. Nargis Mckeon, PharmD, BCPP, BCPS Clinical Pharmacy Specialist, [place]

## 2019-08-04 NOTE — ACP (ADVANCE CARE PLANNING)
Advance Care Planning Note Name: Jose Harris YOB: 1935 MRN: [id number] Admission Date: 8/1/2019 11:28 AM 
 
Date of discussion: 8/4/2019 Active Diagnoses: 
 
Hospital Problems  Date Reviewed: 8/2/2019 Codes Class Noted POA Cardiac arrest [facility]) ICD-10-CM: I46.9 ICD-9-CM: 427.5  8/2/2019 Unknown * (Principal) Aspiration pneumonia ([facility] [address] ICD-10-CM: J69.0 ICD-9-CM: 507.0  8/1/2019 Unknown These active diagnoses are of sufficient risk that focused discussion on advance care planning is indicated in order to allow the patient to thoughtfully consider personal goals of care, and if situations arise that prevent the ability to personally give input, to ensure appropriate representation of their personal desires for different levels and aggressiveness of care. Discussion:  
 
Persons present and participating in discussion: Jose HarrisAimee MD, Pt's daughter Discussion:  
Again reviewed current hospital course, and now improvement in septic shock, requiring less pressors. Unfortunately however he does not have any purposeful movement, and is not following commands, despite being off any drips for the last 24-48 hours. Discussed that even if he passed his SBT, his mental status will remain a barrier to extubation. His daughter asked multiple questions about what it would look like if she withdrew care. Discussed that he will likely pass in a matter of hours to days, as we taper down his pressors, antibitoics, and extubate. She repeated \"I just don't want to kill him, he told me not to let them kill him\". Offered my understanding in this difficult situation. Explained that at this point we are artificially prolonging his life and suffering. By withdrawing care you would be allowing him to pass away comfortably.  Additionally reviewed CT findings and large lung mass with invasion into the mediastinum.  
- Daughter leaning towards comfort measures tomorrow - Palliative care hopefully can assist me in these conversations tomorrow. Time Spent:  
 
Total time spent face-to-face in education and discussion: 25 minutes.   
 
Waqas Brown MD 
8/4/2019 
2:05 PM

## 2019-08-05 NOTE — PALLIATIVE CARE
Palliative Medicine Social Work ADDENDUM: 
1400 Re-convened with wife, daughters and grandson in room. Reviewe his course and plan for Compassionate Extubation. All family at peace with decision and plan. Discussed possibility of transfer and to another floor and hospice consult if stable tomorrow. Dr. Yarelis Chen and I met with patients' daughter, Antonio Morris ([phone]). Reviewed his course and difficult conversations over the last few days. She was tearful in her acknowledgement of his condition; how acutely things have changed. We addressed specific questions she had about how his course changes; why his hands were dark and swollen; the interrelatedness of multiorgan failure. Discussed as well the finding of RUL mass that is likely cancerous. She is struggling to make sense of things; feels like her father was secretive about this health, perhaps trying to protect her and family. Talked about what shift in focus to comfort would look like,. She remains concerned that a shift in focus, especially removing him from vent support is \"killing him\". Offered assurance that this shift was not killing him, but allowing for natural death. Offered assurance that we anticipated he would likely be able to breath on his own for a bit. She seemed relieved by this information and does not want him to suffer. Her mother, sister and son will be coming to the hospital later. We have offered to meet with them when they arrive. Thank you for the opportunity to be involved in the care of Mr. Roberto Carlos Canela and his family. Xuan Craig, RUFUSW, [facility]- Palliative Medicine  Respecting Choices ® ACP Facilitator [phone]

## 2019-08-05 NOTE — PROGRESS NOTES
Haylee NP Progress note Name: Jaswinder Ibanez YOB: 1935 MRN: [id number] Admission Date: 8/1/2019 11:28 AM 
 
Date of service: 8/5/2019 5:30 AM 
 
Overnight Update:  
  
 
Complaint: HGB 5.6 Paged by: Pradeep Navarro, primary nurse Subjective: Patient with HGB 5.6 this AM, down from 6.5 yesterday, no obvious source of bleeding, no prior transfusions during this stay. Family discussing making patient comfort care later today. Plan: - Spoke to patient's daughter, Teresa Villarreal, over the phone regarding blood transfusion. Pros and cons of transfusion were discussed. Given patient's current condition and plan to make comfort care, daughter wishes to hold on transfusion for the time being till she can discuss with primary team in the morning. Kd RANGEL-C, PAKrupaC 
[phone] or TigAyden

## 2019-08-05 NOTE — DIABETES MGMT
[facility] Progress Note Recommendations/ Comments: Chart reveiw for extreme hyperglycemia over the weekend; BG's > 300 mg/dl; received 28 units of lispro correction via normal sensitivity scale in past 24 hours. FBG today 83 mg/dL Noted hx variable BG's with hypoglycemia - last event on 8/2/2019 BG lowest result 51 mg/dL Steroids Solu Cortef 50 mg Q 8 hours. IVF D5 @ 50 ml/hr NPO Elevated creatinine If appropriate, please consider Increase Lantus to 12 units May benefit form change to lispro high sensitivity correction scale due to renal status and higher risk of hypoglycemia Current hospital DM medication:  
Lispro normal sensitivity correction scale Chart reviewed on [employer]. Patient is a 82 y.o. male with known DM on Humulin 70/30, 18 units with breakfast and 10 units after lunch and Humalog sliding scale PTA Pt resides at [facility] A1c:  
No results found for: HBA1C, HGBE8, FJM6KGTD, VZW8GOUY Recent Glucose Results:  
Lab Results Component Value Date/Time GLU 72 08/05/2019 04:15 AM  
 GLUCPOC 83 08/05/2019 05:31 AM  
 GLUCPOC 165 (H) 08/04/2019 11:07 PM  
 GLUCPOC 231 (H) 08/04/2019 06:53 PM  
  
 
Lab Results Component Value Date/Time Creatinine 4.01 (H) 08/05/2019 04:15 AM  
 
Estimated Creatinine Clearance: 13.9 mL/min (A) (based on SCr of 4.01 mg/dL (H)). Active Orders Diet DIET NPO  
  
 
PO intake:  
Patient Vitals for the past 72 hrs: 
 % Diet Eaten 08/04/19 1200 0 % 08/04/19 0800 0 % 08/03/19 1600 0 % 08/03/19 1200 0 % 08/03/19 0800 0 % Will continue to follow as needed. Thank you Espinoza Larios RN, CDE Time spent: 5 min

## 2019-08-05 NOTE — PROGRESS NOTES
SLP Contact Note SLP following this patient for readiness to participate in swallow evaluation. Pt remains intubated and daughter is considering comfort care. Therefore will sign-off. Please reconsult if patient extubated and is appropriate for dysphagia intervention. See previous SLP note for details regarding baseline diet. Thank you, Venancio Ames M.Ed, CCC-SLP Speech-Language Pathologist

## 2019-08-05 NOTE — PROGRESS NOTES
Nephrology Progress Note [facility] Date of Admission : 8/1/2019 CC:  Follow up for SARAH Assessment and Plan SARAH: 
- 2/2 severe volume depletion + ATN post cardiac arrest 
- Non oliguric and not a dialysis candidate - possible withdrawal of care today  
 
  
Hypernatremia: PEA arrest X 2: 
Acute Hypoxic Resp failure ? Aspiration PNA Hypotension: 2/2 sepsis + PEA arrest 
DM2: 
 
Not much else to add at this time Signing off Interval History: 
Seen and examined. Events overnight noted. Daughter decided to hold off on transfusion and considering comfort care. Non oliguric w/ clear urine and stable GFR for last 2-3 days . Current Medications: all current  Medications have been eviewed in [facility] Review of Systems: Review of systems not obtained due to patient factors. Objective: 
Vitals:   
Vitals:  
 08/05/19 0700 08/05/19 0737 08/05/19 0738 08/05/19 0800 BP: 111/63   113/64 Pulse: (!) 108  (!) 105 (!) 104 Resp: 12  10 9 Temp:    97.5 °F (36.4 °C) SpO2: 100% 100% 100% 100% Weight:      
Height:      
 
Intake and Output: 
08/05 0701 - 08/05 1900 In: -  
Out: 200 [Urine:200] 08/03 1901 - 08/05 0700 In: 7256.7 [I.V.:7256.7] Out: 4030 [YMKAT:[id number]] Physical Examination: 
Pt intubated    Yes General: On the vent, awake and opening eyes, does not follow commands Neck:  Supple, no mass Resp:  Decreased bibasilar breath sounds CV:  RRR,  no murmur or rub, no LE edema GI:  Soft, NT, + Bowel sounds, no hepatosplenomegaly Neurologic:  Opens eyes on the vent Psych:             Unable to assess Skin:  No Rash :  Shell in place 
 
[]    High complexity decision making was performed 
[]    Patient is at high-risk of decompensation with multiple organ involvement Lab Data Personally Reviewed: I have reviewed all the pertinent labs, microbiology data and radiology studies during assessment. Recent Labs 08/05/19 
0415 08/04/19 
7594 08/04/19 0020 08/03/19 
1630  08/03/19 
0512  08/02/19 [address] * 143 144 142   < > 146*   < >  --   
K 3.0* 3.8 4.2 4.9   < > 5.5*   < >  --   
 101 102 104   < > 109*   < >  --   
CO2 37* 31 29 26   < > 24   < >  --   
GLU 72 349* 375* 388*   < > 299*   < >  --   
BUN 94* 102* 105* 106*   < > 108*   < >  --   
CREA 4.01* 4.51* 4.53* 4.63*   < > 4.75*   < >  --   
CA 6.5* 6.4* 6.7* 6.2*   < > 6.2*   < >  --   
MG 1.9 2.1  --   --   --  2.4  --   --   
PHOS 5.3* 6.2*  --   --   --   --   --   --   
ALB 0.9* 1.0*  --  1.1*  --  1.2*   < >  --   
SGOT  --   --   --   --   --  1,038*  --   --   
ALT  --   --   --   --   --  221*  --   --   
INR  --   --   --   --   --   --   --  1.9*  
 < > = values in this interval not displayed. Recent Labs 08/05/19 
5641 08/04/19 
5550 08/03/19 
1898 WBC 18.2* 18.9* 19.2* HGB 5.6* 6.5* 6.9*  
HCT 17.9* 21.5* 23.7*  
PLT 87* 102* 107* No results found for: SDES Lab Results Component Value Date/Time Culture result: HEAVY ENTEROBACTER INTERMEDIUM (A) 08/02/2019 09:37 AM  
 Culture result: (A) 08/02/2019 09:37 AM  
  HEAVY **METHICILLIN RESISTANT STAPHYLOCOCCUS AUREUS** CALLED TO AND READ BACK BY  JOS ECKERT ON 8/4/19 AT 1106. [address]  
 Culture result: MODERATE NORMAL RESPIRATORY ARVIND 08/02/2019 09:37 AM  
 
Recent Results (from the past 24 hour(s)) PTT Collection Time: 08/04/19  9:14 AM  
Result Value Ref Range aPTT 62.0 (H) 22.1 - 32.0 sec  
 aPTT, therapeutic range     58.0 - 77.0 SECS  
GLUCOSE, POC Collection Time: 08/04/19 11:30 AM  
Result Value Ref Range Glucose (POC) 324 (H) 65 - 100 mg/dL Performed by Librado Bansal   
PTT Collection Time: 08/04/19  3:09 PM  
Result Value Ref Range aPTT 74.3 (H) 22.1 - 32.0 sec  
 aPTT, therapeutic range     58.0 - 77.0 SECS  
GLUCOSE, POC Collection Time: 08/04/19  6:53 PM  
Result Value Ref Range Glucose (POC) 231 (H) 65 - 100 mg/dL Performed by Marko Hall GLUCOSE, POC  
 Collection Time: 08/04/19 11:07 PM  
Result Value Ref Range Glucose (POC) 165 (H) 65 - 100 mg/dL Performed by [facility] CBC W/O DIFF Collection Time: 08/05/19  4:15 AM  
Result Value Ref Range WBC 18.2 (H) 4.1 - 11.1 K/uL  
 RBC 1.96 (L) 4.10 - 5.70 M/uL HGB 5.6 (LL) 12.1 - 17.0 g/dL HCT 17.9 (LL) 36.6 - 50.3 % MCV 91.3 80.0 - 99.0 FL  
 MCH 28.6 26.0 - 34.0 PG  
 MCHC 31.3 30.0 - 36.5 g/dL  
 RDW 17.3 (H) 11.5 - 14.5 % PLATELET 87 (L) 680 - 400 K/uL MPV 12.1 8.9 - 12.9 FL  
 NRBC 1.9 (H) 0  WBC ABSOLUTE NRBC 0.35 (H) 0.00 - 0.01 K/uL MAGNESIUM Collection Time: 08/05/19  4:15 AM  
Result Value Ref Range Magnesium 1.9 1.6 - 2.4 mg/dL RENAL FUNCTION PANEL Collection Time: 08/05/19  4:15 AM  
Result Value Ref Range Sodium 146 (H) 136 - 145 mmol/L Potassium 3.0 (L) 3.5 - 5.1 mmol/L Chloride 100 97 - 108 mmol/L  
 CO2 37 (H) 21 - 32 mmol/L Anion gap 9 5 - 15 mmol/L Glucose 72 65 - 100 mg/dL BUN 94 (H) 6 - 20 MG/DL Creatinine 4.01 (H) 0.70 - 1.30 MG/DL  
 BUN/Creatinine ratio 23 (H) 12 - 20 GFR est AA 18 (L) >60 ml/min/1.73m2 GFR est non-AA 14 (L) >60 ml/min/1.73m2 Calcium 6.5 (L) 8.5 - 10.1 MG/DL Phosphorus 5.3 (H) 2.6 - 4.7 MG/DL Albumin 0.9 (L) 3.5 - 5.0 g/dL LACTIC ACID Collection Time: 08/05/19  4:15 AM  
Result Value Ref Range Lactic acid 4.4 (HH) 0.4 - 2.0 MMOL/L  
PROCALCITONIN Collection Time: 08/05/19  4:15 AM  
Result Value Ref Range Procalcitonin 27.4 ng/mL GLUCOSE, POC Collection Time: 08/05/19  5:31 AM  
Result Value Ref Range Glucose (POC) 83 65 - 100 mg/dL Performed by Evelin Robison MD 
[facility]  
[address] [facility], [address] [place] [address] Phone - [phone] Fax - [phone] 
www. [email or web address] [employer]

## 2019-08-05 NOTE — PROGRESS NOTES
Transitions of care plan 
 
1: Patient remains unresponsive, intubated, sedated on levophed. 2: Palliative care to meet with daughter this afternoon. Per notes daughter is considering transition to comfort care. Fiordaliza Ashford RN,CRM

## 2019-08-05 NOTE — CDMP QUERY
Good Morning, Patient admitted with Sepsis with Septic Shock. Patient noted to have LUE Edema noted by Dr. Mee Mejia progress note on 8/1/19 at 2110 where he ordered a duplex study of LUE. If possible, please document in progress notes and d/c summary if you are evaluating and/or treating any of the following: 
 
 
=> Acute DVT ( POA) 
=> Chronic DVT (POA) 
==>Acute on Chronic DVT (POA) 
=> Personal History of DVT (POA) 
=> Other Explanation of clinical findings 
=> Clinically Undetermined (no explanation for clinical findings) 
 
he medical record reflects the following: 
Risk Factors: Sepsis with Septic Shock; LUE edema Clinical Indicators: Patient noted with LUE edema on 8/1. Treatment: Duplex studies of LUE ordered 8/1 to r/o DVT. Duplex studies performed 8/2 at 2210 and noted DVT's in IJ, Subclavian, Axillary and Brachial veins. Patient started on Heparin gtt. Thanks, Kasia Dodd [facility], [facility] [address] Reference: 
Acute DVT:  \"Acute\" defines the period of time beginning with the initial diagnosis, up to and including the entire period of time where anticoagulation is instituted (3-12 months) Acute Recurrent DVT: \"Recurrent\" incorporates the definition of acute with the diagnosis of recurrent and ends 3-12 months beyond that time. These patients will likely require lifelong anticoagulation therapy. Chronic DVT:  \"Chronic\" treatment period extending beyond initial 12 months of treatment. Medical condition still exists and is actively being treated. Personal History of DVT: explains the patient's past medical condition that no longer exists, and is not receiving any treatment, but that has the potential for recurrence, and therefore may require monitoring Please clarify and document your clinical opinion in the progress notes and discharge summary including the definitive and/or presumptive diagnosis, (suspected or probable), related to the above clinical findings. Please include clinical findings supporting your diagnosis.

## 2019-08-05 NOTE — PROGRESS NOTES
Death Note Events prior to patients death: 
Called to bedside at 5:05 PM for unresponsive and pulseless patient. On exam, no heart sounds or breath sounds were noted after 1 minute of auscultation. Pupils were fixed and dilated without pupillary light reflex. Patient was pronounced dead on 8/5/2019  at [phone] Date/Time of death: 8/5/2019 at 1425 Cause: 
Immediate: Aspiration Underlying cause:  
Hospital Problems  Date Reviewed: 8/2/2019 Codes Class Noted POA Cardiac arrest [facility]) ICD-10-CM: I46.9 ICD-9-CM: 427.5  8/2/2019 Unknown * (Principal) Aspiration pneumonia ([facility] [address] ICD-10-CM: J69.0 ICD-9-CM: 507.0  8/1/2019 Unknown Physician Pronouncing Death: Kimberly Garner MD 
 
 
Attending Physician of Record:   Finn Campos Events related to death: 
Was code called: NO 
 notified: NO Family notified: Jimmy Wooten Autopsy requested: NO 
Death certificate completed: NO 
Code Status Prior to Death: DNR Discharge summary and death certificate will be completed by: Finn Campos I*

## 2019-08-05 NOTE — PROGRESS NOTES
Problem: Non-Violent Restraints Goal: *Removal from restraints as soon as assessed to be safe 8/5/2019 1734 by Newt Axon Outcome: Resolved/Not Met 
8/5/2019 1229 by Newt Axon Outcome: Progressing Towards Goal 
Goal: *No harm/injury to patient while restraints in use 
8/5/2019 1734 by Newt Axon Outcome: Resolved/Not Met 
8/5/2019 1229 by Newt Axon Outcome: Progressing Towards Goal 
Goal: *Patient's dignity will be maintained 8/5/2019 1734 by Newt Axon Outcome: Resolved/Not Met 
8/5/2019 1229 by Newt Axon Outcome: Progressing Towards Goal 
Goal: *Patient Specific Goal (EDIT GOAL, INSERT TEXT) 
8/5/2019 1734 by Newt Axon Outcome: Resolved/Not Met 
8/5/2019 1229 by Newt Axon Outcome: Progressing Towards Goal 
Goal: Non-violent Restaints:Standard Interventions 8/5/2019 1734 by Newt Axon Outcome: Resolved/Not Met 
8/5/2019 1229 by Newt Axon Outcome: Progressing Towards Goal 
Goal: Non-violent Restraints:Patient Interventions 8/5/2019 1734 by Newt Axon Outcome: Resolved/Not Met 
8/5/2019 1229 by Newt Axon Outcome: Progressing Towards Goal 
Goal: Patient/Family Education 8/5/2019 1734 by Newt Axon Outcome: Resolved/Not Met 
8/5/2019 1229 by Newt Axon Outcome: Progressing Towards Goal 
  
Problem: Ventilator Management Goal: *Adequate oxygenation and ventilation 8/5/2019 1734 by Newt Axon Outcome: Resolved/Not Met 
8/5/2019 1229 by Newt Axon Outcome: Progressing Towards Goal 
Goal: *Patient maintains clear airway/free of aspiration 8/5/2019 1734 by Newt Axon Outcome: Resolved/Not Met 
8/5/2019 1229 by Newt Axon Outcome: Progressing Towards Goal 
Goal: *Absence of infection signs and symptoms 8/5/2019 1734 by Newt Axon Outcome: Resolved/Not Met 
8/5/2019 1229 by Newt Axon Outcome: Progressing Towards Goal 
Goal: *Normal spontaneous ventilation 8/5/2019 1734 by Melvina oMntes Outcome: Resolved/Not Met 
8/5/2019 1229 by Melvina Montes Outcome: Progressing Towards Goal 
  
Problem: Patient Education: Go to Patient Education Activity Goal: Patient/Family Education 8/5/2019 1734 by Melvina Montes Outcome: Resolved/Not Met 
8/5/2019 1229 by Melvina Montes Outcome: Progressing Towards Goal 
  
Problem: Pressure Injury - Risk of 
Goal: *Prevention of pressure injury Description Document Demarco Scale and appropriate interventions in the flowsheet. 8/5/2019 1734 by Melvina Montes Outcome: Resolved/Not Met Note:  
Pressure Injury Interventions: 
Sensory Interventions: Float heels, Pressure redistribution bed/mattress (bed type), Turn and reposition approx. every two hours (pillows and wedges if needed), Suspension boots Moisture Interventions: Absorbent underpads, Check for incontinence Q2 hours and as needed, Internal/External urinary devices Activity Interventions: Pressure redistribution bed/mattress(bed type) Mobility Interventions: Pressure redistribution bed/mattress (bed type), Turn and reposition approx. every two hours(pillow and wedges), Suspension boots, Float heels Nutrition Interventions: Document food/fluid/supplement intake Friction and Shear Interventions: Lift team/patient mobility team, Apply protective barrier, creams and emollients 8/5/2019 1229 by Melvina Montes Outcome: Progressing Towards Goal 
Note:  
Pressure Injury Interventions: 
Sensory Interventions: Float heels, Pressure redistribution bed/mattress (bed type), Turn and reposition approx. every two hours (pillows and wedges if needed), Suspension boots Moisture Interventions: Absorbent underpads, Check for incontinence Q2 hours and as needed, Internal/External urinary devices Activity Interventions: Pressure redistribution bed/mattress(bed type) Mobility Interventions: Pressure redistribution bed/mattress (bed type), Turn and reposition approx. every two hours(pillow and wedges), Suspension boots, Float heels Nutrition Interventions: Document food/fluid/supplement intake Friction and Shear Interventions: Lift team/patient mobility team, Apply protective barrier, creams and emollients Problem: Patient Education: Go to Patient Education Activity Goal: Patient/Family Education 8/5/2019 1734 by Massiel Rawls Outcome: Resolved/Not Met 
8/5/2019 1229 by Massiel Rawls Outcome: Progressing Towards Goal 
  
Problem: Falls - Risk of 
Goal: *Absence of Falls Description Document Jimena Delaney Fall Risk and appropriate interventions in the flowsheet. 8/5/2019 1734 by Massiel Rawls Outcome: Resolved/Not Met Note:  
Fall Risk Interventions: 
Mobility Interventions: Communicate number of staff needed for ambulation/transfer Mentation Interventions: Adequate sleep, hydration, pain control, Evaluate medications/consider consulting pharmacy, More frequent rounding Medication Interventions: Evaluate medications/consider consulting pharmacy Elimination Interventions: Toileting schedule/hourly rounds, Call light in reach History of Falls Interventions: Evaluate medications/consider consulting pharmacy 8/5/2019 1229 by Massiel Rawls Outcome: Progressing Towards Goal 
Note:  
Fall Risk Interventions: 
Mobility Interventions: Communicate number of staff needed for ambulation/transfer Mentation Interventions: Adequate sleep, hydration, pain control, Evaluate medications/consider consulting pharmacy, More frequent rounding Medication Interventions: Evaluate medications/consider consulting pharmacy Elimination Interventions: Toileting schedule/hourly rounds, Call light in reach History of Falls Interventions: Evaluate medications/consider consulting pharmacy Problem: Patient Education: Go to Patient Education Activity Goal: Patient/Family Education 8/5/2019 1734 by Massiel Rawls 
 Outcome: Resolved/Not Met 
8/5/2019 1229 by Wes Underwood Outcome: Progressing Towards Goal 
  
Problem: Risk for Spread of Infection Goal: Prevent transmission of infectious organism to others Description Prevent the transmission of infectious organisms to other patients, staff members, and visitors. 8/5/2019 1734 by Wes Underwood Outcome: Resolved/Not Met 
8/5/2019 1229 by Wes Underwood Outcome: Progressing Towards Goal 
  
Problem: Patient Education:  Go to Education Activity Goal: Patient/Family Education 8/5/2019 1734 by Wes Underwood Outcome: Resolved/Not Met 
8/5/2019 1229 by Wes Underwood Outcome: Progressing Towards Goal 
  
Problem: Patient Education: Go to Patient Education Activity Goal: Patient/Family Education 8/5/2019 1734 by Wes Underwood Outcome: Resolved/Not Met 
8/5/2019 1229 by Wes Underwood Outcome: Progressing Towards Goal 
  
Problem: Pneumonia: Day 1 Goal: Off Pathway (Use only if patient is Off Pathway) 
8/5/2019 1734 by Wes Underwood Outcome: Resolved/Not Met 
8/5/2019 1229 by Wes Underwood Outcome: Progressing Towards Goal 
Goal: Activity/Safety 8/5/2019 1734 by Wes Undrewood Outcome: Resolved/Not Met 
8/5/2019 1229 by Wes Underwood Outcome: Progressing Towards Goal 
Goal: Consults, if ordered 8/5/2019 1734 by Wes Underwood Outcome: Resolved/Not Met 
8/5/2019 1229 by Wes Underwood Outcome: Progressing Towards Goal 
Goal: Diagnostic Test/Procedures 8/5/2019 1734 by Wes Underwood Outcome: Resolved/Not Met 
8/5/2019 1229 by Wes Underwood Outcome: Progressing Towards Goal 
Goal: Nutrition/Diet 8/5/2019 1734 by Wes Underwood Outcome: Resolved/Not Met 
8/5/2019 1229 by Wes Underwood Outcome: Progressing Towards Goal 
Goal: Medications 8/5/2019 1734 by Wes Underwood Outcome: Resolved/Not Met 
8/5/2019 1229 by Wes Underwood Outcome: Progressing Towards Goal 
Goal: Respiratory 8/5/2019 1734 by Wes Underwood 
 Outcome: Resolved/Not Met 
8/5/2019 1229 by Harsha Polo Outcome: Progressing Towards Goal 
Goal: Treatments/Interventions/Procedures 8/5/2019 1734 by Harsha Polo Outcome: Resolved/Not Met 
8/5/2019 1229 by Harsha Polo Outcome: Progressing Towards Goal 
Goal: Psychosocial 
8/5/2019 1734 by Harsha Polo Outcome: Resolved/Not Met 
8/5/2019 1229 by Harsha Polo Outcome: Progressing Towards Goal 
Goal: *Oxygen saturation within defined limits 8/5/2019 1734 by Harsha Polo Outcome: Resolved/Not Met 
8/5/2019 1229 by Hrasha Polo Outcome: Progressing Towards Goal 
Goal: *Influenza vaccine administered (October-March) 
8/5/2019 1734 by Harsha Polo Outcome: Resolved/Not Met 
8/5/2019 1229 by Harsha Polo Outcome: Progressing Towards Goal 
Goal: *Pneumoccocal vaccine administered 8/5/2019 1734 by Harsha Polo Outcome: Resolved/Not Met 
8/5/2019 1229 by Harsha Polo Outcome: Progressing Towards Goal 
Goal: *Hemodynamically stable 8/5/2019 1734 by Harsha Polo Outcome: Resolved/Not Met 
8/5/2019 1229 by Harsha Polo Outcome: Progressing Towards Goal 
Goal: *Demonstrates progressive activity 8/5/2019 1734 by Harsha Polo Outcome: Resolved/Not Met 
8/5/2019 1229 by Harsha Polo Outcome: Progressing Towards Goal 
Goal: *Tolerating diet 8/5/2019 1734 by Harsha Polo Outcome: Resolved/Not Met 
8/5/2019 1229 by Harsha Polo Outcome: Progressing Towards Goal 
  
Problem: Pneumonia: Day 2 Goal: Off Pathway (Use only if patient is Off Pathway) 
8/5/2019 1734 by Harsha Polo Outcome: Resolved/Not Met 
8/5/2019 1229 by Harsha Polo Outcome: Progressing Towards Goal 
Goal: Activity/Safety 8/5/2019 1734 by Harsha Polo Outcome: Resolved/Not Met 
8/5/2019 1229 by Harsha Polo Outcome: Progressing Towards Goal 
Goal: Consults, if ordered 8/5/2019 1734 by Harsha Polo Outcome: Resolved/Not Met 
8/5/2019 1229 by Harsha Polo 
 Outcome: Progressing Towards Goal 
Goal: Diagnostic Test/Procedures 8/5/2019 1734 by [place] Outcome: Resolved/Not Met 
8/5/2019 1229 by [place]a [place] Outcome: Progressing Towards Goal 
Goal: Nutrition/Diet 8/5/2019 1734 by [place]a [place] Outcome: Resolved/Not Met 
8/5/2019 1229 by [place]a [place] Outcome: Progressing Towards Goal 
Goal: Discharge Planning 8/5/2019 1734 by [place] Outcome: Resolved/Not Met 
8/5/2019 1229 by [place]a [place] Outcome: Progressing Towards Goal 
Goal: Medications 8/5/2019 1734 by [place]a [place] Outcome: Resolved/Not Met 
8/5/2019 1229 by [place]a [place] Outcome: Progressing Towards Goal 
Goal: Respiratory 8/5/2019 1734 by [place]a [place] Outcome: Resolved/Not Met 
8/5/2019 1229 by [place]a [place] Outcome: Progressing Towards Goal 
Goal: Treatments/Interventions/Procedures 8/5/2019 1734 by [place] Outcome: Resolved/Not Met 
8/5/2019 1229 by [place]a [place] Outcome: Progressing Towards Goal 
Goal: Psychosocial 
8/5/2019 1734 by [place]a [place] Outcome: Resolved/Not Met 
8/5/2019 1229 by [place]a [place] Outcome: Progressing Towards Goal 
Goal: *Oxygen saturation within defined limits 8/5/2019 1734 by [place]a [place] Outcome: Resolved/Not Met 
8/5/2019 1229 by [place]a [place] Outcome: Progressing Towards Goal 
Goal: *Hemodynamically stable 8/5/2019 1734 by [place]a [place] Outcome: Resolved/Not Met 
8/5/2019 1229 by [place]a [place] Outcome: Progressing Towards Goal 
Goal: *Demonstrates progressive activity 8/5/2019 1734 by [place]a [place] Outcome: Resolved/Not Met 
8/5/2019 1229 by [place] Outcome: Progressing Towards Goal 
Goal: *Tolerating diet 8/5/2019 1734 by [place]a [place] Outcome: Resolved/Not Met 
8/5/2019 1229 by [place]a [place] Outcome: Progressing Towards Goal 
Goal: *Optimal pain control at patient's stated goal 
8/5/2019 1734 by [place]a [place] Outcome: Resolved/Not Met 
8/5/2019 1229 by [place]a [place] 
 Outcome: Progressing Towards Goal 
  
Problem: Pneumonia: Day 3 Goal: Off Pathway (Use only if patient is Off Pathway) 
8/5/2019 1734 by Dony Shore Outcome: Resolved/Not Met 
8/5/2019 1229 by Dony Shore Outcome: Progressing Towards Goal 
Goal: Activity/Safety 8/5/2019 1734 by Dony Shore Outcome: Resolved/Not Met 
8/5/2019 1229 by Dony Shore Outcome: Progressing Towards Goal 
Goal: Consults, if ordered 8/5/2019 1734 by Dony Shore Outcome: Resolved/Not Met 
8/5/2019 1229 by Dony Shore Outcome: Progressing Towards Goal 
Goal: Diagnostic Test/Procedures 8/5/2019 1734 by Dony Shore Outcome: Resolved/Not Met 
8/5/2019 1229 by Dony Shore Outcome: Progressing Towards Goal 
Goal: Nutrition/Diet 8/5/2019 1734 by Dony Shore Outcome: Resolved/Not Met 
8/5/2019 1229 by Dony Shore Outcome: Progressing Towards Goal 
Goal: Discharge Planning 8/5/2019 1734 by Dony Shore Outcome: Resolved/Not Met 
8/5/2019 1229 by Dony Shore Outcome: Progressing Towards Goal 
Goal: Medications 8/5/2019 1734 by Dony [place]res Outcome: Resolved/Not Met 
8/5/2019 1229 by Dony Shore Outcome: Progressing Towards Goal 
Goal: Respiratory 8/5/2019 1734 by Dony Shore Outcome: Resolved/Not Met 
8/5/2019 1229 by Dony Shore Outcome: Progressing Towards Goal 
Goal: Treatments/Interventions/Procedures 8/5/2019 1734 by Dony Shore Outcome: Resolved/Not Met 
8/5/2019 1229 by Dony Shore Outcome: Progressing Towards Goal 
Goal: Psychosocial 
8/5/2019 1734 by Dony [place] Outcome: Resolved/Not Met 
8/5/2019 1229 by Dony Shore Outcome: Progressing Towards Goal 
Goal: *Oxygen saturation within defined limits 8/5/2019 1734 by Dony Shore Outcome: Resolved/Not Met 
8/5/2019 1229 by Dony Shore Outcome: Progressing Towards Goal 
Goal: *Hemodynamically stable 8/5/2019 1734 by Dony Shore Outcome: Resolved/Not Met 
 8/5/2019 1229 by Milta Reining Outcome: Progressing Towards Goal 
Goal: *Demonstrates progressive activity 8/5/2019 1734 by Milta Reining Outcome: Resolved/Not Met 
8/5/2019 1229 by Milta Reining Outcome: Progressing Towards Goal 
Goal: *Tolerating diet 8/5/2019 1734 by Milta Reining Outcome: Resolved/Not Met 
8/5/2019 1229 by Milta Reining Outcome: Progressing Towards Goal 
Goal: *Describes available resources and support systems 8/5/2019 1734 by Milta Reining Outcome: Resolved/Not Met 
8/5/2019 1229 by Milta Reining Outcome: Progressing Towards Goal 
Goal: *Optimal pain control at patient's stated goal 
8/5/2019 1734 by Milta Reining Outcome: Resolved/Not Met 
8/5/2019 1229 by Milta Reining Outcome: Progressing Towards Goal 
  
Problem: Pneumonia: Day 4 Goal: Off Pathway (Use only if patient is Off Pathway) 
8/5/2019 1734 by Milta Reining Outcome: Resolved/Not Met 
8/5/2019 1229 by Milta Reining Outcome: Progressing Towards Goal 
Goal: Activity/Safety 8/5/2019 1734 by Milta Reining Outcome: Resolved/Not Met 
8/5/2019 1229 by Milta Reining Outcome: Progressing Towards Goal 
Goal: Nutrition/Diet 8/5/2019 1734 by Milta Reining Outcome: Resolved/Not Met 
8/5/2019 1229 by Milta Reining Outcome: Progressing Towards Goal 
Goal: Discharge Planning 8/5/2019 1734 by Milta Reining Outcome: Resolved/Not Met 
8/5/2019 1229 by Milta Reining Outcome: Progressing Towards Goal 
Goal: Medications 8/5/2019 1734 by Milta Reining Outcome: Resolved/Not Met 
8/5/2019 1229 by Milta Reining Outcome: Progressing Towards Goal 
Goal: Respiratory 8/5/2019 1734 by Milta Reining Outcome: Resolved/Not Met 
8/5/2019 1229 by Milta Reining Outcome: Progressing Towards Goal 
Goal: Treatments/Interventions/Procedures 8/5/2019 1734 by Milta Reining Outcome: Resolved/Not Met 
8/5/2019 1229 by Milta Reining Outcome: Progressing Towards Goal 
Goal: Psychosocial 
 8/5/2019 1734 by [place] Outcome: Resolved/Not Met 
8/5/2019 1229 by Dony Mappyfriends Outcome: Progressing Towards Goal 
  
Problem: Pneumonia: Discharge Outcomes Goal: *Demonstrates progressive activity 8/5/2019 1734 by Donychad Hernandez Outcome: Resolved/Not Met 
8/5/2019 1229 by Dony David Outcome: Progressing Towards Goal 
Goal: *Describes follow-up/return visits to physicians 8/5/2019 1734 by Donychad Hernandez Outcome: Resolved/Not Met 
8/5/2019 1229 by Dony Shore Outcome: Progressing Towards Goal 
Goal: *Tolerating diet 8/5/2019 1734 by Donychad Hernandez Outcome: Resolved/Not Met 
8/5/2019 1229 by Dony Mappyfriends Outcome: Progressing Towards Goal 
Goal: *Verbalizes name, dosage, time, side effects, and number of days to continue medications 8/5/2019 1734 by Donychad Hernandez Outcome: Resolved/Not Met 
8/5/2019 1229 by Dony Mappyfriends Outcome: Progressing Towards Goal 
Goal: *Influenza immunization 8/5/2019 1734 by Donychad Hernandez Outcome: Resolved/Not Met 
8/5/2019 1229 by Dony David Outcome: Progressing Towards Goal 
Goal: *Pneumococcal immunization 8/5/2019 1734 by Dony David Outcome: Resolved/Not Met 
8/5/2019 1229 by Dony David Outcome: Progressing Towards Goal 
Goal: *Respiratory status at baseline 8/5/2019 1734 by Donychad Hernandez Outcome: Resolved/Not Met 
8/5/2019 1229 by Dony Mappyfriends Outcome: Progressing Towards Goal 
Goal: *Vital signs within defined limits 8/5/2019 1734 by Donychad Hernandez Outcome: Resolved/Not Met 
8/5/2019 1229 by Dony Mappyfriends Outcome: Progressing Towards Goal 
Goal: *Describes available resources and support systems 8/5/2019 1734 by Dony David Outcome: Resolved/Not Met 
8/5/2019 1229 by Dony David Outcome: Progressing Towards Goal 
Goal: *Optimal pain control at patient's stated goal 
8/5/2019 1734 by Donychad Hernandez Outcome: Resolved/Not Met 
8/5/2019 1229 by Dony David Outcome: Progressing Towards Goal

## 2019-08-05 NOTE — PROGRESS NOTES
PULMONARY/Critical Care/SLEEP MEDICINE  [facility] Name: Lena Nava : 1935 MRN: [id number] Date: 2019 8:10 AM  
[]I have reviewed the flowsheet and previous days notes. [x]The patient is unable to give any meaningful history or review of systems because the patient is: 
[x]Intubated []disoriented []Sedated [x]Unresponsive []The patient is critically ill on     
[x]Mechanical ventilation [x]Pressors []BiPAP []  
 
 
 Intubated and vented. Ps overnight. No response to pain full stimuli. RUL lung mass - apical. Levophed at 4. 
 
 He is making urine and is on less pressors. He has a good VT on PS of 5 and his FiO2 is down to to 50%. He is moving around. His daughter is planning on making him comfort care today. 8/3  He remains critically ill on full vent support and 100% FiO2. Jes Leon He was found to have a DVT in his arm by doppler. He continues to require pressors. He continues to require a NaHCO3 drip. He is unresponsive and is unable to give history. Ins >>Outs.  Seen and examined. Events noted. Now with multisystem organ failure and refractory shock- 3 pressors. Worsening metabolic acidosis, renal failure and hyperkalemia- may be too hemodynamically unstable for RRT. Oozing from CVL insertion site. Cultures pending. Hb 7.6. No coags available for review. CXR with right sided infiltrates and infiltrating lung mass. PEA arrest X 2-- remains a full code. No family at bedside  CTSP by hospitalist for resp failure,  dw pt daughter on phone, she could give no detail of his medical history, reports he goes to [facility] at times, now at [facility], could tell me he had prostate cancer many years ago, that's all, not aware of current medical issues, she did spell the word subcutaneous for some reason. He was sent in with resp distress, pneumonia.   I found him in icu on bipap with rr over 35 and mumbling, easily agitated, pulling at fm. I proceeded with intubation. BP now low ROS:Review of systems not obtained due to patient factors. Vital Signs:   
Visit Vitals /59 Pulse (!) 103 Temp 97.5 °F (36.4 °C) Resp 9 Ht 5' 10\" (1.778 m) Wt 68 kg (149 lb 14.6 oz) SpO2 100% BMI 21.51 kg/m² O2 Device: Endotracheal tube, Ventilator Temp (24hrs), Av.5 °F (36.4 °C), Min:96.9 °F (36.1 °C), Max:98.4 °F (36.9 °C) Intake/Output:  
Last shift:       07 -  190 In: -  
Out: 230 [Urine:230] Last 3 shifts: 1901 -  07 In: 7256.7 [I.V.:7256.7] Out: 4030 [FINTJ:8659] Intake/Output Summary (Last 24 hours) at 2019 7254 Last data filed at 2019 0900 Gross per 24 hour Intake 4412.45 ml Output 3120 ml Net 1292.45 ml Physical Exam: 
 
General: [x]Intubated/sedated [x]No acute distress [] [x]Ill appearing [] [] HEENT: []Icteric []PERRL [x]opacified cornea [x]Anicteric Mucosa:[]moist   [x]dry [x] edentulous Resp: []Wheeze []Clear to ascultation bilaterally []Accessory muscle use  
 []Rales []Chest tube:  []Air leak [] []Ronchi []Crepitus [] [x]Coarse bilateral ventilator breath sounds CV: [x]Regular []Tachycardia [] []Irregular []Bradycardic []  
 []Murmur []S3 [] []Edema []S4 []  
  
GI: [x]Soft  [x]NG Tube Bowel sounds: [x]present []absent []Firm []PEG [] []Distended  []  
  
: [x]Shell []Hematuria [] [x]Clear urine []Edema [] MSK:  
 []SCDs [x]Edema lue [x]muscle wasting []No deformities [] []  
  
Skin: [x]Warm [x]Dry  [] []Cool []Moist [] []Hot  []Diaphoretic [] []Rash  []Cyanosis [] N-psych: []Sedated  []Agitated [] []Alert  Follows commands: 
 []Yes  [x]No [] Devices: [x]ET-Tube []Tracheostomy []Chest tube: Air leak? []Y/[]N  
 [x]Central Line []PA Catheter []  
 []PICC [] [] DATA:  
Current Facility-Administered Medications Medication Dose Route Frequency  0.9% sodium chloride infusion 250 mL  250 mL IntraVENous PRN  potassium chloride 10 mEq in 100 ml IVPB  10 mEq IntraVENous Q1H  
 fentaNYL citrate (PF) injection 25 mcg  25 mcg IntraVENous Q4H PRN  
 sodium bicarbonate (8.4%) 150 mEq in dextrose 5% 1,000 mL infusion   IntraVENous CONTINUOUS  
 sodium bicarbonate 8.4 % (1 mEq/mL) injection 50 mEq  50 mEq IntraVENous PRN  
 insulin glargine (LANTUS) injection 10 Units  10 Units SubCUTAneous DAILY  hydrocortisone Sod Succ (PF) (SOLU-CORTEF) injection 50 mg  50 mg IntraVENous Q8H  
 glucose chewable tablet 16 g  4 Tab Oral PRN  
 dextrose (D50W) injection syrg 12.5-25 g  25-50 mL IntraVENous PRN  
 glucagon (GLUCAGEN) injection 1 mg  1 mg IntraMUSCular PRN  
 insulin lispro (HUMALOG) injection   SubCUTAneous Q6H  
 [Held by provider] heparin 25,000 units in D5W 250 ml infusion  18-36 Units/kg/hr IntraVENous TITRATE  sodium chloride (NS) flush 5-10 mL  5-10 mL IntraVENous PRN  
 sodium chloride (NS) flush 5-40 mL  5-40 mL IntraVENous Q8H  
 sodium chloride (NS) flush 5-40 mL  5-40 mL IntraVENous PRN  
 [Held by provider] heparin (porcine) injection 5,000 Units  5,000 Units SubCUTAneous Q12H  
 dextrose 5% infusion  50 mL/hr IntraVENous CONTINUOUS  
 albuterol-ipratropium (DUO-NEB) 2.5 MG-0.5 MG/3 ML  3 mL Nebulization Q6H RT  
 piperacillin-tazobactam (ZOSYN) 3.375 g in 0.9% sodium chloride (MBP/ADV) 100 mL  3.375 g IntraVENous Q12H  VANCOMYCIN INFORMATION NOTE   Other Rx Dosing/Monitoring  glucose chewable tablet 16 g  4 Tab Oral PRN  
 glucagon (GLUCAGEN) injection 1 mg  1 mg IntraMUSCular PRN  
 fentaNYL (PF) 1,500 mcg/30 mL (50 mcg/mL) infusion  0-200 mcg/hr IntraVENous TITRATE  chlorhexidine (PERIDEX) 0.12 % mouthwash 15 mL  15 mL Oral Q12H  
 morphine injection 2 mg  2 mg IntraVENous Q30MIN PRN  
 NOREPINephrine (LEVOPHED) 8 mg in 5% dextrose 250mL infusion  2-100 mcg/min IntraVENous TITRATE  famotidine (PF) (PEPCID) 20 mg in sodium chloride 0.9% 10 mL injection  20 mg IntraVENous Q24H  
 azithromycin (ZITHROMAX) 500 mg in 0.9% sodium chloride (MBP/ADV) 250 mL  500 mg IntraVENous Q24H Telemetry: [x]Sinus []A-flutter []Paced []A-fib []Multiple PVCs Labs: 
Recent Labs 08/05/19 
4962 08/04/19 
3308 08/03/19 
2998 WBC 18.2* 18.9* 19.2* HGB 5.6* 6.5* 6.9*  
HCT 17.9* 21.5* 23.7*  
PLT 87* 102* 107* Recent Labs 08/05/19 
9317 08/04/19 
3796 08/04/19 
0020 08/03/19 
1630  08/03/19 
0512  08/02/19 [address] * [phone]   < > 146*   < >  --   
K 3.0* 3.8 4.2 4.9   < > 5.5*   < >  --   
 101 102 104   < > 109*   < >  --   
CO2 37* 31 29 26   < > 24   < >  --   
GLU 72 349* 375* 388*   < > 299*   < >  --   
BUN 94* 102* 105* 106*   < > 108*   < >  --   
CREA 4.01* 4.51* 4.53* 4.63*   < > 4.75*   < >  --   
CA 6.5* 6.4* 6.7* 6.2*   < > 6.2*   < >  --   
MG 1.9 2.1  --   --   --  2.4  --   --   
PHOS 5.3* 6.2*  --   --   --   --   --   --   
ALB 0.9* 1.0*  --  1.1*  --  1.2*   < >  --   
TBILI  --   --   --   --   --  1.0  --   --   
SGOT  --   --   --   --   --  1,038*  --   --   
ALT  --   --   --   --   --  221*  --   --   
INR  --   --   --   --   --   --   --  1.9*  
 < > = values in this interval not displayed. Imaging: 
[x]I have personally reviewed the patients radiographsmultifocal rt lung infiltrate, anterior mass, emphysema. No new films today []Radiographs reviewed with radiologist 
 []No change from prior, tubes and lines in adequate position []Improved   []Worsening IMPRESSION:  
· Respiratory Failure:  Better. Currently on SBT · Multisystem organ failure: better · Multifocal pneumonia · Chest mass:  History of prostate cancer · Left Arm DVT: on heparin · Sepsis, hypotension: weaning pressors · ARF:  BUN/Cr remain up but he is making urine. · Emphysema · Blind · Anemia wuth drop in hgb. · Lung mass, hx of prostate cancer · Hypernatremia: 
· Unknown LV fx · Very poor prognosis PLAN:  
· Continue SBT. Plans for extubation and comfort care today · Wean levophed to map > 65 mmhg. · Check abg now. Switch him back to acvc after abg. · Fentanyl for comfort as needed · ssi for bs control. Goal bs < 180. · Continue bicarb drip · stress dose steroids · Family not agreeable for blood transfusion. · Vent bundle · Continue Iv abx vanc, azithro, zosyn · Pepcid. · SCDs The patient is: [] acutely ill Risk of deterioration: [] moderate [x] critically ill  [x] high []See my orders for details My assessment/plan was discussed with: 
[x]nursing []PT/OT   
[]respiratory therapy []Dr. Lauren Martin [] [x]Total critical care time exclusive of procedures - 33  Minutes Rosie Monteiro MD 
[facility]

## 2019-08-05 NOTE — PROGRESS NOTES
Palliative Medicine Consult Tony: [phone] ([phone]) Patient Name: Saad Syed YOB: 1935 Date of Initial Consult: 8/2/19 Reason for Consult: Care decisions Requesting Provider: Atif Babcock Primary Care Physician: Unknown, Provider SUMMARY:  
Saad Syed \"[place]\" is a 82 y.o. with a past history of COPD, DM, CKD, aspiration PNA recently at [facility]  who was admitted on 8/1/2019 from [facility] w/ SOB and hypernatremia. Required intubation for resp distress. Late 8/1/19 pt w/ witnessed PEA arrest x2  already on vasopressors maxed out and intubated during arrest. Pt w/ worsening renal function and also found to have 7.8x7. 7x3.4cm mass in RUL invading mediastiunum and sternum. Now w/ sig SARAH, hypotension, UE DVT Family has decided not to pursue CVVH, blood transfusion. Current medical issues leading to Palliative Medicine involvement include: care decisions. Social: Pt not . One child, Carmela Pelaez is NOK. Was living alone until fell, was at [facility] several weeks ago. PALLIATIVE DIAGNOSES:  
1. Multisystem organ dysfunction 2. Large RUL malignancy 3. Debility, not following commands off sedation 4. SOB 5. Goals of care PLAN:  
1. Along w/ Nicole Sites LCSW meet w/ dtr/NOK Monica Valentin to f/u on conversations held over the weekend regarding pt's overall poor prognosis. 2. Monica Valentin appropriately tearful, sounds as though she is starting to accept the situation - for her, it was especially hard since she says pt was always \"secrative\" about this medical issues- when was hospitalized for prostate cancer in the past, did not tell her about it until the day he was being discharged from the hospital. [facility] records show RUL infiltrate when he was there for PNA, discuss that perhaps they were not able to focus on the mass while inpatient due to other medical issues but cannot know for sure.  What we do know is that after 2 PEA arrests, pt's organ systems incl the most important in making him who he is, his brain, was affected and especially w/ large R lung tumor we do not see any of the interventions that we are doing as a bridge to recovery. Pt was always very independent, dtr says. 1. Natacha Marin agrees that no further treatments are going to help pt, and w/ the support of her son \"T\", her sister and mother (these are not pt's NOK of note) she has decided to shift the focus of care to comfort only. Talk about compassionately extubating, ensuring we treat pain, SOB and other sx- and allowing pt to die naturally and peacefully. Pt has told other staff she does not want to \"kill\" her father. Pt is breathing over the vent and I think will cont to do so for a short period of time, but will not be able to sustain himself given his lung tumor, AMS and other end organ dysfunction. We are, once again, allowing natural death not actively \"killing\"- she seems to have some peace about this. 4. Pt has had prayer at bedside. 5. Plan is to shift to comfort w/ compassionate extubation when rest of dtr's family arrives. 1. D/c all medications not associated with comfort. Call pastoral care. 2. D/c all labs 3. Order extubation and comfort care only 4. Pre-medicate with Dilaudid 1.5 mg IV (based on current Fentanyl ggt use) ativan 2 mg IV, and Robinul 0.2mg IV approx  minutes prior to extubation. 5. Turn off all monitors. 6. When ready, would ask RT to extubate. 7. If any distress is noted by nonverbal pain indicators during wean, stop- and mediate as needed before progressing. 8. Bolus frequently as needed, consider ggt if using excess prns. 9. Consult hospice tmrw AM. 6. Communicated plan of care with: Palliative IDT, [facility] Team incl Leela William and Felipe Whipple and Ml RNs Addendum 3pm: Along w/ Macario Flores meet w/ all family (dtr Natacha Marin and her mom and sister and son T) and talk in detail about compassionate extubation, shift to comfort when ready and possible hospice consult or tx to floor if stabilizes. Family voices understanding, do not want pt to suffer. GOALS OF CARE / TREATMENT PREFERENCES:  
 
GOALS OF CARE: 
Patient/Health Care Proxy Stated Goals: (Eventual shift to comfort) TREATMENT PREFERENCES:  
Code Status: DNR Advance Care Planning: 
[x] [facility] Interdisciplinary Team has updated the ACP Navigator with [place] and Patient Capacity Primary Decision Maker: Yudelka Sarkar [facility] - [phone] Other: As far as possible, the palliative care team has discussed with patient / health care proxy about goals of care / treatment preferences for patient. HISTORY:  
 
 
 
CHIEF COMPLAINT: Cannot obtain due to patient factors HPI/SUBJECTIVE: The patient is:  
[] Verbal and participatory [x] Non-participatory due to: medical condition Pt intubated, no sedation, not interacting, moving his extremities. Clinical Pain Assessment (nonverbal scale for severity on nonverbal patients):  
Clinical Pain Assessment Severity: 0 Activity (Movement): Lying quietly, normal position Duration: for how long has pt been experiencing pain (e.g., 2 days, 1 month, years) Frequency: how often pain is an issue (e.g., several times per day, once every few days, constant) FUNCTIONAL ASSESSMENT:  
 
Palliative Performance Scale (PPS): PPS: 20 
 
 
 PSYCHOSOCIAL/SPIRITUAL SCREENING:  
 
Palliative IDT has assessed this patient for cultural preferences / practices and a referral made as appropriate to needs (Cultural Services, Patient Advocacy, Ethics, etc.) Any spiritual / Samaritan concerns: 
[] Yes /  [x] No 
 
Caregiver Burnout: 
[] Yes /  [x] No /  [] No Caregiver Present Anticipatory grief assessment:  
[x] Normal  / [] Maladaptive ESAS Anxiety: ESAS Depression:    
 
/Cannot obtain due to patient factors  REVIEW OF SYSTEMS:  
 
 Positive and pertinent negative findings in ROS are noted above in HPI. The following systems were [] reviewed / [x] unable to be reviewed as noted in HPI Other findings are noted below. Systems: constitutional, ears/nose/mouth/throat, respiratory, gastrointestinal, genitourinary, musculoskeletal, integumentary, neurologic, psychiatric, endocrine. Positive findings noted below. Modified ESAS Completed by: provider Fatigue: 10 Drowsiness: 10 Pain: 0 Dyspnea: 0 Stool Occurrence(s): 1 PHYSICAL EXAM:  
 
From RN flowsheet: 
Wt Readings from Last 3 Encounters:  
08/05/19 149 lb 14.6 oz (68 kg) 05/11/15 167 lb (75.8 kg) 04/24/15 170 lb (77.1 kg) Blood pressure 92/52, pulse (!) 107, temperature 97.3 °F (36.3 °C), resp. rate 11, height 5' 10\" (1.778 m), weight 149 lb 14.6 oz (68 kg), SpO2 93 %. Pain Scale 1: Adult Nonverbal Pain Scale Pain Intensity 1: 0 Pain Intervention(s) 1: Medication (see MAR) Constitutional: on vent, not interacting Eyes: pupils equal, anicteric ENMT: no nasal discharge, moist mucous membranes, ET tube Respiratory: breathing not labored, symmetric Musculoskeletal: no deformity, no tenderness to palpation Skin: warm, dry Neurologic:not arousable HISTORY:  
 
Principal Problem: 
  Aspiration pneumonia ([facility] (8/1/2019) Active Problems: 
  Cardiac arrest ([facility] (8/2/2019) Past Medical History:  
Diagnosis Date  COPD  Diabetes ([facility]  Hypertension  Other ill-defined conditions(799.89)   
 glaucoma History reviewed. No pertinent surgical history. History reviewed. No pertinent family history. History reviewed, no pertinent family history. Social History Tobacco Use  Smoking status: Never Smoker Substance Use Topics  Alcohol use: Yes No Known Allergies Current Facility-Administered Medications Medication Dose Route Frequency  0.9% sodium chloride infusion 250 mL  250 mL IntraVENous PRN  
 HYDROmorphone (PF) (DILAUDID) injection 1.5 mg  1.5 mg IntraVENous Q15MIN PRN  
 LORazepam (ATIVAN) injection 2 mg  2 mg IntraVENous Q15MIN PRN  
 glycopyrrolate (ROBINUL) injection 0.2 mg  0.2 mg IntraVENous Q4H PRN  
 fentaNYL citrate (PF) injection 25 mcg  25 mcg IntraVENous Q4H PRN  
 sodium bicarbonate (8.4%) 150 mEq in dextrose 5% 1,000 mL infusion   IntraVENous CONTINUOUS  
 insulin glargine (LANTUS) injection 10 Units  10 Units SubCUTAneous DAILY  hydrocortisone Sod Succ (PF) (SOLU-CORTEF) injection 50 mg  50 mg IntraVENous Q8H  
 glucose chewable tablet 16 g  4 Tab Oral PRN  
 dextrose (D50W) injection syrg 12.5-25 g  25-50 mL IntraVENous PRN  
 glucagon (GLUCAGEN) injection 1 mg  1 mg IntraMUSCular PRN  
 insulin lispro (HUMALOG) injection   SubCUTAneous Q6H  
 sodium chloride (NS) flush 5-10 mL  5-10 mL IntraVENous PRN  
 sodium chloride (NS) flush 5-40 mL  5-40 mL IntraVENous Q8H  
 sodium chloride (NS) flush 5-40 mL  5-40 mL IntraVENous PRN  
 dextrose 5% infusion  50 mL/hr IntraVENous CONTINUOUS  
 albuterol-ipratropium (DUO-NEB) 2.5 MG-0.5 MG/3 ML  3 mL Nebulization Q6H RT  
 piperacillin-tazobactam (ZOSYN) 3.375 g in 0.9% sodium chloride (MBP/ADV) 100 mL  3.375 g IntraVENous Q12H  VANCOMYCIN INFORMATION NOTE   Other Rx Dosing/Monitoring  glucose chewable tablet 16 g  4 Tab Oral PRN  
 glucagon (GLUCAGEN) injection 1 mg  1 mg IntraMUSCular PRN  
 fentaNYL (PF) 1,500 mcg/30 mL (50 mcg/mL) infusion  0-200 mcg/hr IntraVENous TITRATE  chlorhexidine (PERIDEX) 0.12 % mouthwash 15 mL  15 mL Oral Q12H  
 NOREPINephrine (LEVOPHED) 8 mg in 5% dextrose 250mL infusion  2-100 mcg/min IntraVENous TITRATE  famotidine (PF) (PEPCID) 20 mg in sodium chloride 0.9% 10 mL injection  20 mg IntraVENous Q24H  
 azithromycin (ZITHROMAX) 500 mg in 0.9% sodium chloride (MBP/ADV) 250 mL  500 mg IntraVENous Q24H LAB AND IMAGING FINDINGS:  
 
Lab Results Component Value Date/Time WBC 18.2 (H) 08/05/2019 04:15 AM  
 HGB 5.6 (LL) 08/05/2019 04:15 AM  
 PLATELET 87 (L) 97/61/7494 04:15 AM  
 
Lab Results Component Value Date/Time Sodium 146 (H) 08/05/2019 04:15 AM  
 Potassium 3.0 (L) 08/05/2019 04:15 AM  
 Chloride 100 08/05/2019 04:15 AM  
 CO2 37 (H) 08/05/2019 04:15 AM  
 BUN 94 (H) 08/05/2019 04:15 AM  
 Creatinine 4.01 (H) 08/05/2019 04:15 AM  
 Calcium 6.5 (L) 08/05/2019 04:15 AM  
 Magnesium 1.9 08/05/2019 04:15 AM  
 Phosphorus 5.3 (H) 08/05/2019 04:15 AM  
  
Lab Results Component Value Date/Time AST (SGOT) 1,038 (H) 08/03/2019 05:12 AM  
 Alk. phosphatase 114 08/03/2019 05:12 AM  
 Protein, total 6.2 (L) 08/03/2019 05:12 AM  
 Albumin 0.9 (L) 08/05/2019 04:15 AM  
 Globulin 5.0 (H) 08/03/2019 05:12 AM  
 
Lab Results Component Value Date/Time INR 1.9 (H) 08/02/2019 11:37 AM  
 Prothrombin time 18.2 (H) 08/02/2019 11:37 AM  
 aPTT 74.3 (H) 08/04/2019 03:09 PM  
  
No results found for: IRON, FE, TIBC, IBCT, PSAT, FERR No results found for: PH, PCO2, PO2 No components found for: [place] No results found for: CPK, CKMB Total time: 70 min Counseling / coordination time, spent as noted above: 50 min  
> 50% counseling / coordination?: yes Prolonged service was provided for  [x]30 min   []75 min in face to face time in the presence of the patient, spent as noted above. Time Start: 10am 
Time End: 1015am 
Time Start 120pm 
Time end 140pm 
Note: this can only be billed with  (initial) or 21 [phone] (follow up). If multiple start / stop times, list each separately.

## 2019-08-05 NOTE — PROGRESS NOTES
Hospitalist Progress Note Chasity Gomes MD 
Answering service: [phone] OR [phone] from in house phone Date of Service:  2019 NAME:  Suki Stevens :  1935 MRN:  [id number] Admission Summary:  
The patient is an 29-year-old gentleman who has previous history significant for hypertension, diabetes, chronic kidney disease, and COPD. There is no family available at this time. I did call [facility] where the nurse tells me that he has been there for the last 3 weeks. The patient has previous history of aspiration pneumonia requiring admission at [facility] and has been on nectar-thickened liquids since then. He was found to be hypernatremic yesterday. His sodium was 168. He was given D5 water 1 liter at Nursing home. The patient, this morning, was found to be hypoxic. Interval history / Subjective:  
Pressor requirement decreased on 4 of levophed. Pt does need 1U PRBC, however when night team discussed this with the patient's daughter she requested to hold off as she may be transitioning to comfort measures. Noted worsening ischemia of toes, digits, and foot. Assessment & Plan: PEA arrest x 2 - likely secondary to sepsis as below. - Echo pending Severe Septic shock - likely with source aspiration PNA, with end organ damage (lactic acidosis, acute renal failure, acute respiratory failure). Digit necrosis - HCAP coverage with Vanc, Zosyn, and azithro - Sputum culture --> MRSA, Enterobacter, 
- F/U blood cultures - On levophed, titrate for MAP 65 
- SDS 
- Trend lactate, down trending - Trend procalcitonin - IF transitioning to comfort measures, will DC all antibiotics, and all non essential medications. Acute hypoxic respiratory failure - intubated. - intensivist following, appreciate recs - Sedation per intensivist, now off - CXR daily - ABG daily 
- vent bundle Acute renal failure, hyperkalemia  - urine output is improving. 
- Nephrology consulted - Family decided not to pursue CVVH 
- Shell placed, continue strict I and O  
- IV bicarb for now Acute UE DVT - started on heparin gtt 8/3, holding due to anemia and bloody bowel movement. GI bleed - likely ischemic colitis from very high pressor requirement. Monitor for now and hold heparin AG Metabolic acidosis - combination of sepsis, lactic acidosis, renal failure. Montior, on bicarb gtt. improving Persistent lactic acidosis - improving. as high as 10, secondary to severe septic shock, continue to monitor, down trending. 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 Palliative following. Type 2 diabetes with hyperglycemia - elevated, SSI, POCT glucose checks and hypoglycemia protocol. DC D5, hold glargine Hypernatremia - secondary to poor PO intake, continue D5W, nephrology following HTN - holding all medications due to septic shock H/o COPD - nebs PRN 
H/o prostate cancer - unknown stage, or how advanced. Anemia - likely acute on chronic from chronic disease, transfuse for hemoglobin less than 7, continue to monitor Code status: no CPR. Continue pressors, and intubation for now. Re-evaluate daily with family. Potentially will withdraw care today DVT prophylaxis: SCDs Care Plan discussed with: Patient/Family Disposition: TBD Hospital Problems  Date Reviewed: 8/2/2019 Codes Class Noted POA Cardiac arrest [facility]) ICD-10-CM: I46.9 ICD-9-CM: 427.5  8/2/2019 Unknown * (Principal) Aspiration pneumonia ([facility] [address] ICD-10-CM: J69.0 ICD-9-CM: 507.0  8/1/2019 Unknown Review of Systems:  
Review of systems not obtained due to patient factors. Vital Signs:  
 Last 24hrs VS reviewed since prior progress note. Most recent are: 
Visit Vitals /60 Pulse (!) 105 Temp 97.3 °F (36.3 °C) Resp 12 Ht 5' 10\" (1.778 m) Wt 68 kg (149 lb 14.6 oz) SpO2 95% BMI 21.51 kg/m² Intake/Output Summary (Last 24 hours) at 8/5/2019 1419 Last data filed at 8/5/2019 1300 Gross per 24 hour Intake 4641.33 ml Output 3230 ml Net 1411.33 ml Physical Examination:  
 
 
     
Constitutional:  Intubated, sedated. ENT:  Oral mucous moist, oropharynx benign. + temporal wasting Resp:  Course bilaterally R> L, no increased work of breathing, ETT in place. CV:  Regular rhythm, + Tachycardic, no murmurs, gallops, rubs GI:  Soft, non distended, non tender. normoactive bowel sounds, no hepatosplenomegaly Musculoskeletal:  No edema, warm, 2+ pulses throughout, necrotic digits, necrotic L foot. Neurologic:  Intubated and not following commands for me. Skin:  Good turgor, no rashes or ulcers Data Review:  
Reviewed all imaging and laboratory data. CT scan personally reviewed. Labs:  
 
Recent Labs 08/05/19 
8557 08/04/19 
1172 WBC 18.2* 18.9* HGB 5.6* 6.5* HCT 17.9* 21.5* PLT 87* 102* Recent Labs 08/05/19 
3681 08/04/19 
9470 08/04/19 
0020  08/03/19 
8325 * 143 144   < > 146*  
K 3.0* 3.8 4.2   < > 5.5*  
 101 102   < > 109* CO2 37* 31 29   < > 24 BUN 94* 102* 105*   < > 108* CREA 4.01* 4.51* 4.53*   < > 4.75* GLU 72 349* 375*   < > 299* CA 6.5* 6.4* 6.7*   < > 6.2*  
MG 1.9 2.1  --   --  2.4 PHOS 5.3* 6.2*  --   --   --   
 < > = values in this interval not displayed. Recent Labs 08/05/19 
1179 08/04/19 
5230 08/03/19 
1630 08/03/19 
3523 SGOT  --   --   --  1,038* ALT  --   --   --  221* AP  --   --   --  114 TBILI  --   --   --  1.0 TP  --   --   --  6.2* ALB 0.9* 1.0* 1.1* 1.2*  
GLOB  --   --   --  5.0* Recent Labs 08/04/19 
1509 08/04/19 
0914 08/04/19 
0020 APTT 74.3* 62.0* 92.5* No results for input(s): FE, TIBC, PSAT, FERR in the last 72 hours.   
No results found for: FOL, RBCF  
 No results for input(s): PH, PCO2, PO2 in the last 72 hours. No results for input(s): CPK, CKNDX, TROIQ in the last 72 hours. No lab exists for component: CPKMB No results found for: CHOL, CHOLX, CHLST, CHOLV, HDL, LDL, LDLC, DLDLP, TGLX, TRIGL, TRIGP, CHHD, CHHDX Lab Results Component Value Date/Time Glucose (POC) 167 (H) 08/05/2019 11:33 AM  
 Glucose (POC) 52 (L) 08/05/2019 11:14 AM  
 Glucose (POC) 83 08/05/2019 05:31 AM  
 Glucose (POC) 165 (H) 08/04/2019 11:07 PM  
 Glucose (POC) 231 (H) 08/04/2019 06:53 PM  
 
Lab Results Component Value Date/Time Color YELLOW/STRAW 08/01/2019 12:45 PM  
 Appearance TURBID (A) 08/01/2019 12:45 PM  
 Specific gravity 1.019 08/01/2019 12:45 PM  
 pH (UA) 6.0 08/01/2019 12:45 PM  
 Protein 100 (A) 08/01/2019 12:45 PM  
 Glucose NEGATIVE  08/01/2019 12:45 PM  
 Ketone NEGATIVE  08/01/2019 12:45 PM  
 Bilirubin NEGATIVE  08/01/2019 12:45 PM  
 Urobilinogen 1.0 08/01/2019 12:45 PM  
 Nitrites NEGATIVE  08/01/2019 12:45 PM  
 Leukocyte Esterase LARGE (A) 08/01/2019 12:45 PM  
 Epithelial cells MODERATE (A) 08/01/2019 12:45 PM  
 Bacteria NEGATIVE  08/01/2019 12:45 PM  
 WBC 0-4 08/01/2019 12:45 PM  
 RBC 0-5 08/01/2019 12:45 PM  
 
 
 
Medications Reviewed:  
 
Current Facility-Administered Medications Medication Dose Route Frequency  0.9% sodium chloride infusion 250 mL  250 mL IntraVENous PRN  
 HYDROmorphone (PF) (DILAUDID) injection 1.5 mg  1.5 mg IntraVENous Q15MIN PRN  
 LORazepam (ATIVAN) injection 2 mg  2 mg IntraVENous Q15MIN PRN  
 glycopyrrolate (ROBINUL) injection 0.2 mg  0.2 mg IntraVENous Q4H PRN  
 fentaNYL citrate (PF) injection 25 mcg  25 mcg IntraVENous Q4H PRN  
 sodium bicarbonate (8.4%) 150 mEq in dextrose 5% 1,000 mL infusion   IntraVENous CONTINUOUS  
 insulin glargine (LANTUS) injection 10 Units  10 Units SubCUTAneous DAILY  hydrocortisone Sod Succ (PF) (SOLU-CORTEF) injection 50 mg  50 mg IntraVENous Q8H  
  glucose chewable tablet 16 g  4 Tab Oral PRN  
 dextrose (D50W) injection syrg 12.5-25 g  25-50 mL IntraVENous PRN  
 glucagon (GLUCAGEN) injection 1 mg  1 mg IntraMUSCular PRN  
 insulin lispro (HUMALOG) injection   SubCUTAneous Q6H  
 sodium chloride (NS) flush 5-10 mL  5-10 mL IntraVENous PRN  
 sodium chloride (NS) flush 5-40 mL  5-40 mL IntraVENous Q8H  
 sodium chloride (NS) flush 5-40 mL  5-40 mL IntraVENous PRN  
 dextrose 5% infusion  50 mL/hr IntraVENous CONTINUOUS  
 albuterol-ipratropium (DUO-NEB) 2.5 MG-0.5 MG/3 ML  3 mL Nebulization Q6H RT  
 piperacillin-tazobactam (ZOSYN) 3.375 g in 0.9% sodium chloride (MBP/ADV) 100 mL  3.375 g IntraVENous Q12H  VANCOMYCIN INFORMATION NOTE   Other Rx Dosing/Monitoring  glucose chewable tablet 16 g  4 Tab Oral PRN  
 glucagon (GLUCAGEN) injection 1 mg  1 mg IntraMUSCular PRN  
 fentaNYL (PF) 1,500 mcg/30 mL (50 mcg/mL) infusion  0-200 mcg/hr IntraVENous TITRATE  chlorhexidine (PERIDEX) 0.12 % mouthwash 15 mL  15 mL Oral Q12H  
 NOREPINephrine (LEVOPHED) 8 mg in 5% dextrose 250mL infusion  2-100 mcg/min IntraVENous TITRATE  famotidine (PF) (PEPCID) 20 mg in sodium chloride 0.9% 10 mL injection  20 mg IntraVENous Q24H  
 azithromycin (ZITHROMAX) 500 mg in 0.9% sodium chloride (MBP/ADV) 250 mL  500 mg IntraVENous Q24H  
 
______________________________________________________________________ EXPECTED LENGTH OF STAY: 4d 19h ACTUAL LENGTH OF STAY:          4 
 
            
Yimi Chiu MD

## 2019-08-05 NOTE — PROGRESS NOTES
Problem: Non-Violent Restraints Goal: *Removal from restraints as soon as assessed to be safe Outcome: Progressing Towards Goal 
Goal: *No harm/injury to patient while restraints in use Outcome: Progressing Towards Goal 
Goal: *Patient's dignity will be maintained Outcome: Progressing Towards Goal 
Goal: *Patient Specific Goal (EDIT GOAL, INSERT TEXT) Outcome: Progressing Towards Goal 
Goal: Non-violent Restaints:Standard Interventions Outcome: Progressing Towards Goal 
Goal: Non-violent Restraints:Patient Interventions Outcome: Progressing Towards Goal 
Goal: Patient/Family Education Outcome: Progressing Towards Goal 
  
Problem: Ventilator Management Goal: *Adequate oxygenation and ventilation Outcome: Progressing Towards Goal 
Goal: *Patient maintains clear airway/free of aspiration Outcome: Progressing Towards Goal 
Goal: *Absence of infection signs and symptoms Outcome: Progressing Towards Goal 
Goal: *Normal spontaneous ventilation Outcome: Progressing Towards Goal 
  
Problem: Patient Education: Go to Patient Education Activity Goal: Patient/Family Education Outcome: Progressing Towards Goal 
  
Problem: Pressure Injury - Risk of 
Goal: *Prevention of pressure injury Description Document Demarco Scale and appropriate interventions in the flowsheet. Outcome: Progressing Towards Goal 
Note:  
Pressure Injury Interventions: 
Sensory Interventions: Float heels, Pressure redistribution bed/mattress (bed type), Turn and reposition approx. every two hours (pillows and wedges if needed), Suspension boots Moisture Interventions: Absorbent underpads, Check for incontinence Q2 hours and as needed, Internal/External urinary devices Activity Interventions: Pressure redistribution bed/mattress(bed type) Mobility Interventions: Pressure redistribution bed/mattress (bed type), Turn and reposition approx. every two hours(pillow and wedges), Suspension boots, Float heels Nutrition Interventions: Document food/fluid/supplement intake Friction and Shear Interventions: Lift team/patient mobility team, Apply protective barrier, creams and emollients Problem: Patient Education: Go to Patient Education Activity Goal: Patient/Family Education Outcome: Progressing Towards Goal 
  
Problem: Falls - Risk of 
Goal: *Absence of Falls Description Document Jimena Delaney Fall Risk and appropriate interventions in the flowsheet. Outcome: Progressing Towards Goal 
Note:  
Fall Risk Interventions: 
Mobility Interventions: Communicate number of staff needed for ambulation/transfer Mentation Interventions: Adequate sleep, hydration, pain control, Evaluate medications/consider consulting pharmacy, More frequent rounding Medication Interventions: Evaluate medications/consider consulting pharmacy Elimination Interventions: Toileting schedule/hourly rounds, Call light in reach History of Falls Interventions: Evaluate medications/consider consulting pharmacy Problem: Patient Education: Go to Patient Education Activity Goal: Patient/Family Education Outcome: Progressing Towards Goal 
  
Problem: Risk for Spread of Infection Goal: Prevent transmission of infectious organism to others Description Prevent the transmission of infectious organisms to other patients, staff members, and visitors. Outcome: Progressing Towards Goal 
  
Problem: Patient Education:  Go to Education Activity Goal: Patient/Family Education Outcome: Progressing Towards Goal 
  
Problem: Patient Education: Go to Patient Education Activity Goal: Patient/Family Education Outcome: Progressing Towards Goal 
  
Problem: Pneumonia: Day 1 Goal: Off Pathway (Use only if patient is Off Pathway) Outcome: Progressing Towards Goal 
Goal: Activity/Safety Outcome: Progressing Towards Goal 
Goal: Consults, if ordered Outcome: Progressing Towards Goal 
Goal: Diagnostic Test/Procedures Outcome: Progressing Towards Goal 
Goal: Nutrition/Diet Outcome: Progressing Towards Goal 
Goal: Medications Outcome: Progressing Towards Goal 
Goal: Respiratory Outcome: Progressing Towards Goal 
Goal: Treatments/Interventions/Procedures Outcome: Progressing Towards Goal 
Goal: Psychosocial 
Outcome: Progressing Towards Goal 
Goal: *Oxygen saturation within defined limits Outcome: Progressing Towards Goal 
Goal: *Influenza vaccine administered (October-March) Outcome: Progressing Towards Goal 
Goal: *Pneumoccocal vaccine administered Outcome: Progressing Towards Goal 
Goal: *Hemodynamically stable Outcome: Progressing Towards Goal 
Goal: *Demonstrates progressive activity Outcome: Progressing Towards Goal 
Goal: *Tolerating diet Outcome: Progressing Towards Goal 
  
Problem: Pneumonia: Day 2 Goal: Off Pathway (Use only if patient is Off Pathway) Outcome: Progressing Towards Goal 
Goal: Activity/Safety Outcome: Progressing Towards Goal 
Goal: Consults, if ordered Outcome: Progressing Towards Goal 
Goal: Diagnostic Test/Procedures Outcome: Progressing Towards Goal 
Goal: Nutrition/Diet Outcome: Progressing Towards Goal 
Goal: Discharge Planning Outcome: Progressing Towards Goal 
Goal: Medications Outcome: Progressing Towards Goal 
Goal: Respiratory Outcome: Progressing Towards Goal 
Goal: Treatments/Interventions/Procedures Outcome: Progressing Towards Goal 
Goal: Psychosocial 
Outcome: Progressing Towards Goal 
Goal: *Oxygen saturation within defined limits Outcome: Progressing Towards Goal 
Goal: *Hemodynamically stable Outcome: Progressing Towards Goal 
Goal: *Demonstrates progressive activity Outcome: Progressing Towards Goal 
Goal: *Tolerating diet Outcome: Progressing Towards Goal 
Goal: *Optimal pain control at patient's stated goal 
Outcome: Progressing Towards Goal 
  
Problem: Pneumonia: Day 3 Goal: Off Pathway (Use only if patient is Off Pathway) Outcome: Progressing Towards Goal 
 Goal: Activity/Safety Outcome: Progressing Towards Goal 
Goal: Consults, if ordered Outcome: Progressing Towards Goal 
Goal: Diagnostic Test/Procedures Outcome: Progressing Towards Goal 
Goal: Nutrition/Diet Outcome: Progressing Towards Goal 
Goal: Discharge Planning Outcome: Progressing Towards Goal 
Goal: Medications Outcome: Progressing Towards Goal 
Goal: Respiratory Outcome: Progressing Towards Goal 
Goal: Treatments/Interventions/Procedures Outcome: Progressing Towards Goal 
Goal: Psychosocial 
Outcome: Progressing Towards Goal 
Goal: *Oxygen saturation within defined limits Outcome: Progressing Towards Goal 
Goal: *Hemodynamically stable Outcome: Progressing Towards Goal 
Goal: *Demonstrates progressive activity Outcome: Progressing Towards Goal 
Goal: *Tolerating diet Outcome: Progressing Towards Goal 
Goal: *Describes available resources and support systems Outcome: Progressing Towards Goal 
Goal: *Optimal pain control at patient's stated goal 
Outcome: Progressing Towards Goal 
  
Problem: Pneumonia: Day 4 Goal: Off Pathway (Use only if patient is Off Pathway) Outcome: Progressing Towards Goal 
Goal: Activity/Safety Outcome: Progressing Towards Goal 
Goal: Nutrition/Diet Outcome: Progressing Towards Goal 
Goal: Discharge Planning Outcome: Progressing Towards Goal 
Goal: Medications Outcome: Progressing Towards Goal 
Goal: Respiratory Outcome: Progressing Towards Goal 
Goal: Treatments/Interventions/Procedures Outcome: Progressing Towards Goal 
Goal: Psychosocial 
Outcome: Progressing Towards Goal 
  
Problem: Pneumonia: Discharge Outcomes Goal: *Demonstrates progressive activity Outcome: Progressing Towards Goal 
Goal: *Describes follow-up/return visits to physicians Outcome: Progressing Towards Goal 
Goal: *Tolerating diet Outcome: Progressing Towards Goal 
Goal: *Verbalizes name, dosage, time, side effects, and number of days to continue medications Outcome: Progressing Towards Goal 
Goal: *Influenza immunization Outcome: Progressing Towards Goal 
Goal: *Pneumococcal immunization Outcome: Progressing Towards Goal 
Goal: *Respiratory status at baseline Outcome: Progressing Towards Goal 
Goal: *Vital signs within defined limits Outcome: Progressing Towards Goal 
Goal: *Describes available resources and support systems Outcome: Progressing Towards Goal 
Goal: *Optimal pain control at patient's stated goal 
Outcome: Progressing Towards Goal

## 2019-08-05 NOTE — PROGRESS NOTES
2345: Report received from Tomas Brannon RN. Heparin and fentanyl drips verified. Pt in bed, intubated w/ vent settings: pressure support 5, PEEP 5, FiO2 50% and Vt 400. Pt not sedated. Pt breathing spontaneously RR 11-20s (increased rate w/ restlessness), taking great Vt 500-700s, and sats >95%. On levo drip for MAP goal >65. Cuff and arterial line correlate. Heparin drip infusing, PTT therapeutic for 2 readings, next PTT draw due at 1500 tmw. 9510: Increased levo drip, MAP <65. 
 
0046: Pt BP elevated, increased HR, and  restless in bed. Increased fentanyl drip. 
 
0205: Increased levo drip, MAP <65. 
 
0420: Labs sent. 2560: Critical lab results: Hgb 5.6, Hct 17.6 
 
0452: Paged hospitalist regarding critical lab results. 3845: Angela Ferraro, JHONNY returned page, plan to give 2u PRBCs.  
 
0500: Pt does not have blood consent, informed JHONNY Quintero, and called pt's daughter, Juliann Giles (POA/NOK). 3508: JHONNY Quintero and I both spoke w/ Juliann Giles regarding pt's Hgb results and need for blood. Juliann Giles would like to speak w/ Dr. Aneudy Moore today before consenting for blood d/t plan to w/d care today. 4552: Critical lactic, 4.4 (trending down) [id number]: Paged hospitalist regarding pt's K 3.0. Fabiola Aden page returned. Order received for 4 runs KCl. 
 
0615: Pt had bloody-black moderate sized loose BM. 6272: MAP 80, decreased levo drip. 
 
0750: Dr. Aneudy Moore called, plan to stop heparin drip d/t bloody stools and Hgb dropping. Heparin drip stopped. 0800: Bedside and Verbal shift change report given to Rohit James and JOS Pleitez. Report included the following information SBAR, Intake/Output, MAR, Recent Results, Cardiac Rhythm Sinus tach and Alarm Parameters .

## 2019-08-05 NOTE — PROGRESS NOTES
Visited in response to notification of pt's death in ICU. Pt's two daughters and two of his grandchildren at bedside. Listened to their expressions of grief and offered presence. Chaplains will continue to offer support as needed. Derrick Brannonin, MDiv, MS, [facility] 
[phone])

## 2019-08-05 NOTE — PROGRESS NOTES
Palliative medicine~ Speak to joi/KARI Keller, to f/u up on conversations held over the weekend. She plans on coming today early afternoon after seeing her PCP. Her son has been helping her make decisions- does not feel that blood transfusions, dialysis or cont intubation are helping him. Will have RN page me when she gets here, dtr says \"I think we are going to take out the breathing tube\" today.

## 2019-08-05 NOTE — PROGRESS NOTES
0730: Bedside and Verbal shift change report given to Maik, RN and Arslan Lake, RN (oncoming nurse) by Bora Mcgowan, JOS (offgoing nurse). Report included the following information SBAR, Kardex, Intake/Output, MAR, Recent Results, Cardiac Rhythm NSR and Alarm Parameters . 0900: Dr. Marizol Blue at bedside, updated on patient's condition. His team will sign off of patient's case. 1117: D50 given for BG 52, will do recheck within 15 mins. 1132: Recheck .  
 
1300: Patient's daughter at bedside, paged Dr. Nash Bills and Dr. Mina Frausto. 1444: Called [facility] and spoke with Jarowena English to notify of patient's situation and plan of care. 1500: Palliative team at bedside with patient's family. 1516: Patient extubated for comfort care. Family at bedside. RNs and RT with patient at bedside. 1622: Paged Dr. Mina Frausto to notify of Asystole on the monitor. 1623: Paged hospitals  to notify of patient's condition. 1625:  Dr. Mina Frausto, RNs, and  Mervat Ignacio at bedside, heart and lung sound absent, [facility] notified at [phone].

## 2019-08-05 NOTE — DISCHARGE SUMMARY
Discharge Summary PATIENT ID: Ruth Win MRN: [id number] YOB: 1935 DATE OF ADMISSION: 8/1/2019 11:28 AM   
DATE OF DISCHARGE: 08/05/2019 PRIMARY CARE PROVIDER: Unknown, Provider DISCHARGING PROVIDER: Al Rachel MD   
To contact this individual call [phone] and ask the  to page. If unavailable ask to be transferred the Adult Hospitalist Department. CONSULTATIONS: IP CONSULT TO HOSPITALIST 
IP CONSULT TO NEPHROLOGY 
IP CONSULT TO PALLIATIVE CARE - PROVIDER 
IP CONSULT TO PALLIATIVE CARE - PROVIDER PROCEDURES/SURGERIES: * No surgery found * ADMITTING DIAGNOSES & HOSPITAL COURSE:  
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 
GI Bleed Angely 15 Type 2 diabetes Admitted with acute hypoxic respiratory failure and severe septic shock secondary to PNA. Underwent intubation closely after admission. CT chest revealed large lung mass with invasion into mediastinum and PNA. Pt had CVC placed, started on pressors. Required x 3 pressors to maintain his MAP. On DOA had x 2 bradycardic and PEA arrest with ROSC achieved within 1-2 minutes. Over the following days, pressors requirement decreased with improvement in hemodynamics. Unfortunately he did not have any purposeful movement despite being off sedation for over 48 hours. He likely sustained anoxic injury after the arrests/sepsis. Multiple family discussion took place, and they decided to withdraw care on 8/5. Comfort measures were in place, and patient passed away comfortably. See plan below for plan of care prior to shifting to comfort measures. DISCHARGE DIAGNOSES / PLAN:   
 
PEA arrest x 2 - likely secondary to sepsis as below.   
- Echo pending  
  
Severe Septic shock - likely with source aspiration PNA, with end organ damage (lactic acidosis, acute renal failure, acute respiratory failure). Digit necrosis - HCAP coverage with Vanc, Zosyn, and azithro - Sputum culture --> MRSA, Enterobacter, 
- F/U blood cultures - On levophed, titrate for MAP 65 
- SDS 
- Trend lactate, down trending - Trend procalcitonin - IF transitioning to comfort measures, will DC all antibiotics, and all non essential medications.  
  
Acute hypoxic respiratory failure - intubated. - intensivist following, appreciate recs - Sedation per intensivist, now off - CXR daily - ABG daily 
- vent bundle 
  
Acute renal failure, hyperkalemia  - urine output is improving. 
- Nephrology consulted - Family decided not to pursue CVVH 
- Shell placed, continue strict I and O  
- IV bicarb for now  
  
Acute UE DVT - started on heparin gtt 8/3, holding due to anemia and bloody bowel movement. GI bleed - likely ischemic colitis from very high pressor requirement. Monitor for now and hold heparin AG Metabolic acidosis - combination of sepsis, lactic acidosis, renal failure. Montior, on bicarb gtt. improving Persistent lactic acidosis - improving. as high as 10, secondary to severe septic shock, continue to monitor, down trending. Large lung mass with extension into mediastinum - no known h/o lung cancer, but also usually gets his care at [address] [place] [address]. Palliative following. Type 2 diabetes with hyperglycemia - elevated, SSI, POCT glucose checks and hypoglycemia protocol. DC D5, hold glargine Hypernatremia - secondary to poor PO intake, continue D5W, nephrology following HTN - holding all medications due to septic shock H/o COPD - nebs PRN 
H/o prostate cancer - unknown stage, or how advanced. Anemia - likely acute on chronic from chronic disease, transfuse for hemoglobin less than 7, continue to monitor Physical exam: See death note. CHRONIC MEDICAL DIAGNOSES: 
Problem List as of 8/5/2019 Date Reviewed: 8/2/2019 Codes Class Noted - Resolved Cardiac arrest [facility]) ICD-10-CM: I46.9 ICD-9-CM: 427.5  8/2/2019 - Present * (Principal) Aspiration pneumonia ([facility] [address] ICD-10-CM: J69.0 ICD-9-CM: 507.0  8/1/2019 - Present Greater than 30 minutes were spent with the patient on counseling and coordination of care Signed:  
Kianna Gutierrez MD 
8/5/2019 
5:06 PM

## 2019-08-06 LAB
BACTERIA SPEC CULT: NORMAL
SERVICE CMNT-IMP: NORMAL